# Patient Record
Sex: FEMALE | Race: BLACK OR AFRICAN AMERICAN | NOT HISPANIC OR LATINO | Employment: FULL TIME | URBAN - METROPOLITAN AREA
[De-identification: names, ages, dates, MRNs, and addresses within clinical notes are randomized per-mention and may not be internally consistent; named-entity substitution may affect disease eponyms.]

---

## 2017-09-18 PROBLEM — K59.00 CONSTIPATION: Status: ACTIVE | Noted: 2017-09-18

## 2017-09-18 PROBLEM — M54.9 CVA TENDERNESS: Status: ACTIVE | Noted: 2017-09-18

## 2021-05-06 ENCOUNTER — PATIENT MESSAGE (OUTPATIENT)
Dept: RESEARCH | Facility: HOSPITAL | Age: 66
End: 2021-05-06

## 2021-07-01 ENCOUNTER — PATIENT MESSAGE (OUTPATIENT)
Dept: ADMINISTRATIVE | Facility: OTHER | Age: 66
End: 2021-07-01

## 2023-01-01 ENCOUNTER — PATIENT MESSAGE (OUTPATIENT)
Dept: RESEARCH | Facility: HOSPITAL | Age: 68
End: 2023-01-01
Payer: MEDICARE

## 2023-01-01 ENCOUNTER — HOSPITAL ENCOUNTER (INPATIENT)
Facility: HOSPITAL | Age: 68
LOS: 2 days | Discharge: HOSPICE | DRG: 441 | End: 2023-08-09
Attending: STUDENT IN AN ORGANIZED HEALTH CARE EDUCATION/TRAINING PROGRAM | Admitting: STUDENT IN AN ORGANIZED HEALTH CARE EDUCATION/TRAINING PROGRAM
Payer: MEDICARE

## 2023-01-01 ENCOUNTER — CLINICAL SUPPORT (OUTPATIENT)
Dept: REHABILITATION | Facility: HOSPITAL | Age: 68
End: 2023-01-01
Payer: MEDICARE

## 2023-01-01 ENCOUNTER — HOSPITAL ENCOUNTER (EMERGENCY)
Facility: HOSPITAL | Age: 68
Discharge: HOME | End: 2023-08-06
Attending: STUDENT IN AN ORGANIZED HEALTH CARE EDUCATION/TRAINING PROGRAM
Payer: MEDICARE

## 2023-01-01 ENCOUNTER — DOCUMENTATION ONLY (OUTPATIENT)
Dept: REHABILITATION | Facility: HOSPITAL | Age: 68
End: 2023-01-01

## 2023-01-01 ENCOUNTER — HOSPITAL ENCOUNTER (INPATIENT)
Facility: HOSPITAL | Age: 68
LOS: 2 days | DRG: 951 | End: 2023-08-11
Attending: STUDENT IN AN ORGANIZED HEALTH CARE EDUCATION/TRAINING PROGRAM | Admitting: INTERNAL MEDICINE
Payer: MEDICARE

## 2023-01-01 VITALS
RESPIRATION RATE: 20 BRPM | TEMPERATURE: 99 F | OXYGEN SATURATION: 100 % | SYSTOLIC BLOOD PRESSURE: 116 MMHG | HEART RATE: 93 BPM | DIASTOLIC BLOOD PRESSURE: 71 MMHG

## 2023-01-01 VITALS
HEIGHT: 63 IN | BODY MASS INDEX: 19.41 KG/M2 | WEIGHT: 109.56 LBS | DIASTOLIC BLOOD PRESSURE: 70 MMHG | TEMPERATURE: 98 F | SYSTOLIC BLOOD PRESSURE: 108 MMHG | OXYGEN SATURATION: 100 % | RESPIRATION RATE: 9 BRPM | HEART RATE: 123 BPM

## 2023-01-01 VITALS
RESPIRATION RATE: 10 BRPM | TEMPERATURE: 97 F | DIASTOLIC BLOOD PRESSURE: 35 MMHG | OXYGEN SATURATION: 89 % | SYSTOLIC BLOOD PRESSURE: 61 MMHG | HEART RATE: 106 BPM

## 2023-01-01 DIAGNOSIS — Z51.5 COMFORT MEASURES ONLY STATUS: ICD-10-CM

## 2023-01-01 DIAGNOSIS — R26.9 GAIT ABNORMALITY: ICD-10-CM

## 2023-01-01 DIAGNOSIS — Z74.09 DECREASED STRENGTH, ENDURANCE, AND MOBILITY: ICD-10-CM

## 2023-01-01 DIAGNOSIS — R29.898 IMPAIRED FLEXIBILITY OF LOWER EXTREMITY: ICD-10-CM

## 2023-01-01 DIAGNOSIS — K74.60 HEPATIC CIRRHOSIS, UNSPECIFIED HEPATIC CIRRHOSIS TYPE, UNSPECIFIED WHETHER ASCITES PRESENT: ICD-10-CM

## 2023-01-01 DIAGNOSIS — Z91.81 AT MAXIMUM RISK FOR FALL: ICD-10-CM

## 2023-01-01 DIAGNOSIS — E87.20 LACTIC ACIDOSIS: ICD-10-CM

## 2023-01-01 DIAGNOSIS — R53.1 WEAKNESS: ICD-10-CM

## 2023-01-01 DIAGNOSIS — K70.31 ASCITES DUE TO ALCOHOLIC CIRRHOSIS: Primary | ICD-10-CM

## 2023-01-01 DIAGNOSIS — K65.2 SPONTANEOUS BACTERIAL PERITONITIS: ICD-10-CM

## 2023-01-01 DIAGNOSIS — R53.1 WEAKNESS: Primary | ICD-10-CM

## 2023-01-01 DIAGNOSIS — E72.20 HYPERAMMONEMIA: ICD-10-CM

## 2023-01-01 DIAGNOSIS — R53.1 GENERALIZED WEAKNESS: ICD-10-CM

## 2023-01-01 DIAGNOSIS — R53.1 DECREASED STRENGTH, ENDURANCE, AND MOBILITY: ICD-10-CM

## 2023-01-01 DIAGNOSIS — R26.89 IMPAIRMENT OF BALANCE: ICD-10-CM

## 2023-01-01 DIAGNOSIS — R68.89 DECREASED FUNCTIONAL ACTIVITY TOLERANCE: ICD-10-CM

## 2023-01-01 DIAGNOSIS — K74.60 HEPATIC CIRRHOSIS, UNSPECIFIED HEPATIC CIRRHOSIS TYPE, UNSPECIFIED WHETHER ASCITES PRESENT: Primary | ICD-10-CM

## 2023-01-01 DIAGNOSIS — R07.9 CHEST PAIN: ICD-10-CM

## 2023-01-01 DIAGNOSIS — R18.8 OTHER ASCITES: ICD-10-CM

## 2023-01-01 DIAGNOSIS — R68.89 DECREASED STRENGTH, ENDURANCE, AND MOBILITY: ICD-10-CM

## 2023-01-01 LAB
ABO + RH BLD: NORMAL
ABO + RH BLD: NORMAL
AFP SERPL-MCNC: 9.8 NG/ML (ref 0–8.4)
ALBUMIN FLD-MCNC: <0.4 G/DL
ALBUMIN SERPL BCP-MCNC: 1.3 G/DL (ref 3.5–5.2)
ALBUMIN SERPL BCP-MCNC: 1.3 G/DL (ref 3.5–5.2)
ALBUMIN SERPL BCP-MCNC: 2.2 G/DL (ref 3.5–5.2)
ALBUMIN SERPL BCP-MCNC: 2.3 G/DL (ref 3.5–5.2)
ALLENS TEST: ABNORMAL
ALP SERPL-CCNC: 43 U/L (ref 55–135)
ALP SERPL-CCNC: 56 U/L (ref 55–135)
ALP SERPL-CCNC: 67 U/L (ref 55–135)
ALP SERPL-CCNC: 70 U/L (ref 55–135)
ALT SERPL W/O P-5'-P-CCNC: 21 U/L (ref 10–44)
ALT SERPL W/O P-5'-P-CCNC: 28 U/L (ref 10–44)
ALT SERPL W/O P-5'-P-CCNC: 31 U/L (ref 10–44)
ALT SERPL W/O P-5'-P-CCNC: 34 U/L (ref 10–44)
AMMONIA PLAS-SCNC: 138 UMOL/L (ref 10–50)
AMMONIA PLAS-SCNC: 39 UMOL/L (ref 10–50)
AMPHET+METHAMPHET UR QL: NEGATIVE
ANA SER QL IF: NORMAL
ANION GAP SERPL CALC-SCNC: 10 MMOL/L (ref 8–16)
ANION GAP SERPL CALC-SCNC: 10 MMOL/L (ref 8–16)
ANION GAP SERPL CALC-SCNC: 11 MMOL/L (ref 8–16)
ANION GAP SERPL CALC-SCNC: 19 MMOL/L (ref 8–16)
ANISOCYTOSIS BLD QL SMEAR: ABNORMAL
ANISOCYTOSIS BLD QL SMEAR: ABNORMAL
APPEARANCE FLD: CLEAR
APTT PPP: 42.7 SEC (ref 21–32)
APTT PPP: 45.1 SEC (ref 21–32)
AST SERPL-CCNC: 110 U/L (ref 10–40)
AST SERPL-CCNC: 60 U/L (ref 10–40)
AST SERPL-CCNC: 81 U/L (ref 10–40)
AST SERPL-CCNC: 94 U/L (ref 10–40)
BACTERIA #/AREA URNS AUTO: ABNORMAL /HPF
BACTERIA #/AREA URNS HPF: ABNORMAL /HPF
BACTERIA SPEC AEROBE CULT: NO GROWTH
BACTERIA UR CULT: NO GROWTH
BARBITURATES UR QL SCN>200 NG/ML: NEGATIVE
BASOPHILS # BLD AUTO: 0.01 K/UL (ref 0–0.2)
BASOPHILS # BLD AUTO: 0.02 K/UL (ref 0–0.2)
BASOPHILS # BLD AUTO: 0.03 K/UL (ref 0–0.2)
BASOPHILS # BLD AUTO: 0.03 K/UL (ref 0–0.2)
BASOPHILS # BLD AUTO: 0.04 K/UL (ref 0–0.2)
BASOPHILS NFR BLD: 0.1 % (ref 0–1.9)
BASOPHILS NFR BLD: 0.2 % (ref 0–1.9)
BASOPHILS NFR BLD: 0.5 % (ref 0–1.9)
BASOPHILS NFR BLD: 0.7 % (ref 0–1.9)
BASOPHILS NFR BLD: 0.7 % (ref 0–1.9)
BENZODIAZ UR QL SCN>200 NG/ML: NEGATIVE
BILIRUB DIRECT SERPL-MCNC: 4.3 MG/DL (ref 0.1–0.3)
BILIRUB DIRECT SERPL-MCNC: 5 MG/DL (ref 0.1–0.3)
BILIRUB SERPL-MCNC: 6.2 MG/DL (ref 0.1–1)
BILIRUB SERPL-MCNC: 7.1 MG/DL (ref 0.1–1)
BILIRUB SERPL-MCNC: 8.3 MG/DL (ref 0.1–1)
BILIRUB SERPL-MCNC: 8.5 MG/DL (ref 0.1–1)
BILIRUB UR QL STRIP: ABNORMAL
BILIRUB UR QL STRIP: ABNORMAL
BLD GP AB SCN CELLS X3 SERPL QL: NORMAL
BLD GP AB SCN CELLS X3 SERPL QL: NORMAL
BLD PROD TYP BPU: NORMAL
BLD PROD TYP BPU: NORMAL
BLOOD UNIT EXPIRATION DATE: NORMAL
BLOOD UNIT EXPIRATION DATE: NORMAL
BLOOD UNIT TYPE CODE: 5100
BLOOD UNIT TYPE CODE: 5100
BLOOD UNIT TYPE: NORMAL
BLOOD UNIT TYPE: NORMAL
BNP SERPL-MCNC: 70 PG/ML (ref 0–99)
BODY FLD TYPE: NORMAL
BODY FLUID SOURCE, LDH: NORMAL
BUN SERPL-MCNC: 16 MG/DL (ref 8–23)
BUN SERPL-MCNC: 17 MG/DL (ref 8–23)
BUN SERPL-MCNC: 21 MG/DL (ref 6–30)
BUN SERPL-MCNC: 22 MG/DL (ref 8–23)
BUN SERPL-MCNC: 23 MG/DL (ref 8–23)
BURR CELLS BLD QL SMEAR: ABNORMAL
BZE UR QL SCN: NEGATIVE
CALCIUM SERPL-MCNC: 7.7 MG/DL (ref 8.7–10.5)
CALCIUM SERPL-MCNC: 8 MG/DL (ref 8.7–10.5)
CALCIUM SERPL-MCNC: 8 MG/DL (ref 8.7–10.5)
CALCIUM SERPL-MCNC: 8.6 MG/DL (ref 8.7–10.5)
CANNABINOIDS UR QL SCN: ABNORMAL
CHLORIDE SERPL-SCNC: 100 MMOL/L (ref 95–110)
CHLORIDE SERPL-SCNC: 96 MMOL/L (ref 95–110)
CHLORIDE SERPL-SCNC: 97 MMOL/L (ref 95–110)
CHLORIDE SERPL-SCNC: 99 MMOL/L (ref 95–110)
CHLORIDE SERPL-SCNC: 99 MMOL/L (ref 95–110)
CK SERPL-CCNC: 26 U/L (ref 20–180)
CLARITY UR REFRACT.AUTO: ABNORMAL
CLARITY UR: ABNORMAL
CLINICAL BIOCHEMIST REVIEW: NORMAL
CO2 SERPL-SCNC: 15 MMOL/L (ref 23–29)
CO2 SERPL-SCNC: 21 MMOL/L (ref 23–29)
CO2 SERPL-SCNC: 23 MMOL/L (ref 23–29)
CO2 SERPL-SCNC: 23 MMOL/L (ref 23–29)
CODING SYSTEM: NORMAL
CODING SYSTEM: NORMAL
COLOR FLD: YELLOW
COLOR UR AUTO: YELLOW
COLOR UR: ABNORMAL
CREAT SERPL-MCNC: 0.8 MG/DL (ref 0.5–1.4)
CREAT SERPL-MCNC: 1.1 MG/DL (ref 0.5–1.4)
CREAT SERPL-MCNC: 1.2 MG/DL (ref 0.5–1.4)
CREAT SERPL-MCNC: 1.6 MG/DL (ref 0.5–1.4)
CREAT SERPL-MCNC: 1.7 MG/DL (ref 0.5–1.4)
CREAT UR-MCNC: 109 MG/DL (ref 15–325)
CROSSMATCH INTERPRETATION: NORMAL
CROSSMATCH INTERPRETATION: NORMAL
DACRYOCYTES BLD QL SMEAR: ABNORMAL
DACRYOCYTES BLD QL SMEAR: ABNORMAL
DELSYS: ABNORMAL
DELSYS: ABNORMAL
DIFFERENTIAL METHOD: ABNORMAL
DISPENSE STATUS: NORMAL
DISPENSE STATUS: NORMAL
EOSINOPHIL # BLD AUTO: 0 K/UL (ref 0–0.5)
EOSINOPHIL # BLD AUTO: 0.1 K/UL (ref 0–0.5)
EOSINOPHIL NFR BLD: 0.4 % (ref 0–8)
EOSINOPHIL NFR BLD: 1 % (ref 0–8)
EOSINOPHIL NFR BLD: 1.6 % (ref 0–8)
EOSINOPHIL NFR BLD: 1.7 % (ref 0–8)
EOSINOPHIL NFR BLD: 2.8 % (ref 0–8)
ERYTHROCYTE [DISTWIDTH] IN BLOOD BY AUTOMATED COUNT: 23.2 % (ref 11.5–14.5)
ERYTHROCYTE [DISTWIDTH] IN BLOOD BY AUTOMATED COUNT: 24 % (ref 11.5–14.5)
ERYTHROCYTE [DISTWIDTH] IN BLOOD BY AUTOMATED COUNT: 24.5 % (ref 11.5–14.5)
ERYTHROCYTE [DISTWIDTH] IN BLOOD BY AUTOMATED COUNT: 24.5 % (ref 11.5–14.5)
ERYTHROCYTE [DISTWIDTH] IN BLOOD BY AUTOMATED COUNT: 24.9 % (ref 11.5–14.5)
EST. GFR  (NO RACE VARIABLE): 33.1 ML/MIN/1.73 M^2
EST. GFR  (NO RACE VARIABLE): 35.6 ML/MIN/1.73 M^2
EST. GFR  (NO RACE VARIABLE): 55.8 ML/MIN/1.73 M^2
EST. GFR  (NO RACE VARIABLE): >60 ML/MIN/1.73 M^2
ETHANOL UR-MCNC: <10 MG/DL
FIO2: 24
FIO2: 24
FLOW: 1
FLOW: 1
GGT SERPL-CCNC: 27 U/L (ref 8–55)
GLUCOSE SERPL-MCNC: 119 MG/DL (ref 70–110)
GLUCOSE SERPL-MCNC: 85 MG/DL (ref 70–110)
GLUCOSE SERPL-MCNC: 95 MG/DL (ref 70–110)
GLUCOSE SERPL-MCNC: 95 MG/DL (ref 70–110)
GLUCOSE SERPL-MCNC: 96 MG/DL (ref 70–110)
GLUCOSE UR QL STRIP: NEGATIVE
GLUCOSE UR QL STRIP: NEGATIVE
GRAM STN SPEC: NORMAL
GRAM STN SPEC: NORMAL
HCO3 UR-SCNC: 27.5 MMOL/L (ref 24–28)
HCT VFR BLD AUTO: 18.6 % (ref 37–48.5)
HCT VFR BLD AUTO: 19.4 % (ref 37–48.5)
HCT VFR BLD AUTO: 23.2 % (ref 37–48.5)
HCT VFR BLD AUTO: 24.3 % (ref 37–48.5)
HCT VFR BLD AUTO: 28.9 % (ref 37–48.5)
HCT VFR BLD CALC: 27 %PCV (ref 36–54)
HGB BLD-MCNC: 5.8 G/DL (ref 12–16)
HGB BLD-MCNC: 6.4 G/DL (ref 12–16)
HGB BLD-MCNC: 7.8 G/DL (ref 12–16)
HGB BLD-MCNC: 7.8 G/DL (ref 12–16)
HGB BLD-MCNC: 9.7 G/DL (ref 12–16)
HGB UR QL STRIP: ABNORMAL
HGB UR QL STRIP: ABNORMAL
HIV 1+2 AB+HIV1 P24 AG SERPL QL IA: NORMAL
HYALINE CASTS #/AREA URNS LPF: 1 /LPF
HYALINE CASTS UR QL AUTO: 61 /LPF
HYPOCHROMIA BLD QL SMEAR: ABNORMAL
HYPOCHROMIA BLD QL SMEAR: ABNORMAL
IMM GRANULOCYTES # BLD AUTO: 0.03 K/UL (ref 0–0.04)
IMM GRANULOCYTES # BLD AUTO: 0.03 K/UL (ref 0–0.04)
IMM GRANULOCYTES # BLD AUTO: 0.04 K/UL (ref 0–0.04)
IMM GRANULOCYTES NFR BLD AUTO: 0.4 % (ref 0–0.5)
IMM GRANULOCYTES NFR BLD AUTO: 0.5 % (ref 0–0.5)
IMM GRANULOCYTES NFR BLD AUTO: 0.6 % (ref 0–0.5)
IMM GRANULOCYTES NFR BLD AUTO: 0.7 % (ref 0–0.5)
IMM GRANULOCYTES NFR BLD AUTO: 0.7 % (ref 0–0.5)
INR PPP: 2.4 (ref 0.8–1.2)
INR PPP: 2.4 (ref 0.8–1.2)
INR PPP: 2.6 (ref 0.8–1.2)
KETONES UR QL STRIP: ABNORMAL
KETONES UR QL STRIP: NEGATIVE
LACTATE SERPL-SCNC: 10.3 MMOL/L (ref 0.5–2.2)
LACTATE SERPL-SCNC: 3.4 MMOL/L (ref 0.5–2.2)
LACTATE SERPL-SCNC: 3.7 MMOL/L (ref 0.5–2.2)
LACTATE SERPL-SCNC: 4.9 MMOL/L (ref 0.5–2.2)
LACTATE SERPL-SCNC: 5.3 MMOL/L (ref 0.5–2.2)
LACTATE SERPL-SCNC: 6 MMOL/L (ref 0.5–2.2)
LACTATE SERPL-SCNC: 7.1 MMOL/L (ref 0.5–2.2)
LDH FLD L TO P-CCNC: 147 U/L
LEUKOCYTE ESTERASE UR QL STRIP: ABNORMAL
LEUKOCYTE ESTERASE UR QL STRIP: NEGATIVE
LIPASE SERPL-CCNC: 25 U/L (ref 4–60)
LIPASE SERPL-CCNC: 55 U/L (ref 4–60)
LKM AB SER-ACNC: 1.2 UNITS
LYMPHOCYTES # BLD AUTO: 0.9 K/UL (ref 1–4.8)
LYMPHOCYTES # BLD AUTO: 1.1 K/UL (ref 1–4.8)
LYMPHOCYTES # BLD AUTO: 1.2 K/UL (ref 1–4.8)
LYMPHOCYTES # BLD AUTO: 1.5 K/UL (ref 1–4.8)
LYMPHOCYTES # BLD AUTO: 2.2 K/UL (ref 1–4.8)
LYMPHOCYTES NFR BLD: 13.5 % (ref 18–48)
LYMPHOCYTES NFR BLD: 13.9 % (ref 18–48)
LYMPHOCYTES NFR BLD: 14.5 % (ref 18–48)
LYMPHOCYTES NFR BLD: 32.6 % (ref 18–48)
LYMPHOCYTES NFR BLD: 36.1 % (ref 18–48)
LYMPHOCYTES NFR FLD MANUAL: 49 %
MAGNESIUM SERPL-MCNC: 2.1 MG/DL (ref 1.6–2.6)
MAGNESIUM SERPL-MCNC: 2.3 MG/DL (ref 1.6–2.6)
MCH RBC QN AUTO: 31.6 PG (ref 27–31)
MCH RBC QN AUTO: 31.9 PG (ref 27–31)
MCH RBC QN AUTO: 32 PG (ref 27–31)
MCH RBC QN AUTO: 32.4 PG (ref 27–31)
MCH RBC QN AUTO: 33 PG (ref 27–31)
MCHC RBC AUTO-ENTMCNC: 31.2 G/DL (ref 32–36)
MCHC RBC AUTO-ENTMCNC: 32.1 G/DL (ref 32–36)
MCHC RBC AUTO-ENTMCNC: 33 G/DL (ref 32–36)
MCHC RBC AUTO-ENTMCNC: 33.6 G/DL (ref 32–36)
MCHC RBC AUTO-ENTMCNC: 33.6 G/DL (ref 32–36)
MCV RBC AUTO: 100 FL (ref 82–98)
MCV RBC AUTO: 100 FL (ref 82–98)
MCV RBC AUTO: 104 FL (ref 82–98)
MCV RBC AUTO: 94 FL (ref 82–98)
MCV RBC AUTO: 95 FL (ref 82–98)
MESOTHL CELL NFR FLD MANUAL: 9 %
METHADONE UR QL SCN>300 NG/ML: NEGATIVE
MICROSCOPIC COMMENT: ABNORMAL
MICROSCOPIC COMMENT: ABNORMAL
MODE: ABNORMAL
MODE: ABNORMAL
MONOCYTES # BLD AUTO: 0.4 K/UL (ref 0.3–1)
MONOCYTES # BLD AUTO: 0.5 K/UL (ref 0.3–1)
MONOCYTES # BLD AUTO: 0.6 K/UL (ref 0.3–1)
MONOCYTES NFR BLD: 12 % (ref 4–15)
MONOCYTES NFR BLD: 5.3 % (ref 4–15)
MONOCYTES NFR BLD: 6.7 % (ref 4–15)
MONOCYTES NFR BLD: 7.1 % (ref 4–15)
MONOCYTES NFR BLD: 9.4 % (ref 4–15)
MONOS+MACROS NFR FLD MANUAL: 34 %
NEUTROPHILS # BLD AUTO: 2.3 K/UL (ref 1.8–7.7)
NEUTROPHILS # BLD AUTO: 3.1 K/UL (ref 1.8–7.7)
NEUTROPHILS # BLD AUTO: 4.9 K/UL (ref 1.8–7.7)
NEUTROPHILS # BLD AUTO: 6.3 K/UL (ref 1.8–7.7)
NEUTROPHILS # BLD AUTO: 6.4 K/UL (ref 1.8–7.7)
NEUTROPHILS NFR BLD: 51.2 % (ref 38–73)
NEUTROPHILS NFR BLD: 51.5 % (ref 38–73)
NEUTROPHILS NFR BLD: 76.5 % (ref 38–73)
NEUTROPHILS NFR BLD: 76.9 % (ref 38–73)
NEUTROPHILS NFR BLD: 80.2 % (ref 38–73)
NEUTROPHILS NFR FLD MANUAL: 8 %
NITRITE UR QL STRIP: NEGATIVE
NITRITE UR QL STRIP: NEGATIVE
NRBC BLD-RTO: 0 /100 WBC
NRBC BLD-RTO: 1 /100 WBC
NUM UNITS TRANS PACKED RBC: NORMAL
NUM UNITS TRANS PACKED RBC: NORMAL
OPIATES UR QL SCN: NEGATIVE
OSMOLALITY SERPL: 298 MOSM/KG (ref 275–295)
OSMOLALITY UR: 378 MOSM/KG (ref 50–1200)
PCO2 BLDA: 40.8 MMHG (ref 35–45)
PCP UR QL SCN>25 NG/ML: NEGATIVE
PH SMN: 7.44 [PH] (ref 7.35–7.45)
PH UR STRIP: 6 [PH] (ref 5–8)
PH UR STRIP: 6 [PH] (ref 5–8)
PHOSPHATE SERPL-MCNC: 4.7 MG/DL (ref 2.7–4.5)
PLATELET # BLD AUTO: 44 K/UL (ref 150–450)
PLATELET # BLD AUTO: 51 K/UL (ref 150–450)
PLATELET # BLD AUTO: 57 K/UL (ref 150–450)
PLATELET # BLD AUTO: 77 K/UL (ref 150–450)
PLATELET # BLD AUTO: 83 K/UL (ref 150–450)
PLATELET BLD QL SMEAR: ABNORMAL
PLATELET BLD QL SMEAR: ABNORMAL
PLPETH BLD-MCNC: <10 NG/ML
PMV BLD AUTO: 10 FL (ref 9.2–12.9)
PMV BLD AUTO: 10.6 FL (ref 9.2–12.9)
PMV BLD AUTO: 9.6 FL (ref 9.2–12.9)
PMV BLD AUTO: 9.6 FL (ref 9.2–12.9)
PMV BLD AUTO: 9.9 FL (ref 9.2–12.9)
PO2 BLDA: 27 MMHG (ref 40–60)
PO2 BLDA: 30 MMHG (ref 40–60)
PO2 BLDA: 30 MMHG (ref 40–60)
POC BE: 3 MMOL/L
POC IONIZED CALCIUM: 1 MMOL/L (ref 1.06–1.42)
POC SATURATED O2: 52 % (ref 95–100)
POC SATURATED O2: 62 % (ref 95–100)
POC SATURATED O2: 62 % (ref 95–100)
POC TCO2 (MEASURED): 24 MMOL/L (ref 23–29)
POC TCO2: 29 MMOL/L (ref 24–29)
POCT GLUCOSE: 106 MG/DL (ref 70–110)
POCT GLUCOSE: 113 MG/DL (ref 70–110)
POCT GLUCOSE: 118 MG/DL (ref 70–110)
POCT GLUCOSE: 118 MG/DL (ref 70–110)
POCT GLUCOSE: 99 MG/DL (ref 70–110)
POIKILOCYTOSIS BLD QL SMEAR: SLIGHT
POLYCHROMASIA BLD QL SMEAR: ABNORMAL
POPETH BLD-MCNC: <10 NG/ML
POTASSIUM BLD-SCNC: 3.8 MMOL/L (ref 3.5–5.1)
POTASSIUM SERPL-SCNC: 3.1 MMOL/L (ref 3.5–5.1)
POTASSIUM SERPL-SCNC: 3.3 MMOL/L (ref 3.5–5.1)
POTASSIUM SERPL-SCNC: 3.7 MMOL/L (ref 3.5–5.1)
POTASSIUM SERPL-SCNC: 3.8 MMOL/L (ref 3.5–5.1)
PROT FLD-MCNC: 1.4 G/DL
PROT SERPL-MCNC: 5.6 G/DL (ref 6–8.4)
PROT SERPL-MCNC: 6.8 G/DL (ref 6–8.4)
PROT SERPL-MCNC: 7.3 G/DL (ref 6–8.4)
PROT SERPL-MCNC: 7.6 G/DL (ref 6–8.4)
PROT UR QL STRIP: ABNORMAL
PROT UR QL STRIP: NEGATIVE
PROTHROMBIN TIME: 23.8 SEC (ref 9–12.5)
PROTHROMBIN TIME: 23.9 SEC (ref 9–12.5)
PROTHROMBIN TIME: 26.6 SEC (ref 9–12.5)
RBC # BLD AUTO: 1.79 M/UL (ref 4–5.4)
RBC # BLD AUTO: 1.94 M/UL (ref 4–5.4)
RBC # BLD AUTO: 2.44 M/UL (ref 4–5.4)
RBC # BLD AUTO: 2.47 M/UL (ref 4–5.4)
RBC # BLD AUTO: 3.04 M/UL (ref 4–5.4)
RBC #/AREA URNS AUTO: >100 /HPF (ref 0–4)
RBC #/AREA URNS HPF: 1 /HPF (ref 0–4)
SAMPLE: ABNORMAL
SCHISTOCYTES BLD QL SMEAR: ABNORMAL
SCHISTOCYTES BLD QL SMEAR: PRESENT
SCHISTOCYTES BLD QL SMEAR: PRESENT
SITE: ABNORMAL
SMUDGE CELLS BLD QL SMEAR: PRESENT
SODIUM BLD-SCNC: 135 MMOL/L (ref 136–145)
SODIUM SERPL-SCNC: 129 MMOL/L (ref 136–145)
SODIUM SERPL-SCNC: 132 MMOL/L (ref 136–145)
SODIUM SERPL-SCNC: 132 MMOL/L (ref 136–145)
SODIUM SERPL-SCNC: 134 MMOL/L (ref 136–145)
SODIUM UR-SCNC: <10 MMOL/L (ref 20–250)
SP GR UR STRIP: >1.03 (ref 1–1.03)
SP GR UR STRIP: >=1.03 (ref 1–1.03)
SP02: 100
SP02: 100
SPECIMEN OUTDATE: NORMAL
SPECIMEN OUTDATE: NORMAL
SPECIMEN SOURCE: NORMAL
SPECIMEN SOURCE: NORMAL
SQUAMOUS #/AREA URNS AUTO: 36 /HPF
SQUAMOUS #/AREA URNS HPF: 1 /HPF
TARGETS BLD QL SMEAR: ABNORMAL
TOXICOLOGY INFORMATION: ABNORMAL
TROPONIN I SERPL DL<=0.01 NG/ML-MCNC: <0.006 NG/ML (ref 0–0.03)
TSH SERPL DL<=0.005 MIU/L-ACNC: 2.38 UIU/ML (ref 0.4–4)
URN SPEC COLLECT METH UR: ABNORMAL
URN SPEC COLLECT METH UR: ABNORMAL
UROBILINOGEN UR STRIP-ACNC: >=8 EU/DL
WBC # BLD AUTO: 4.57 K/UL (ref 3.9–12.7)
WBC # BLD AUTO: 6.09 K/UL (ref 3.9–12.7)
WBC # BLD AUTO: 6.31 K/UL (ref 3.9–12.7)
WBC # BLD AUTO: 7.98 K/UL (ref 3.9–12.7)
WBC # BLD AUTO: 8.23 K/UL (ref 3.9–12.7)
WBC # FLD: 54 /CU MM
WBC #/AREA URNS AUTO: 18 /HPF (ref 0–5)
WBC #/AREA URNS HPF: 6 /HPF (ref 0–5)

## 2023-01-01 PROCEDURE — 80053 COMPREHEN METABOLIC PANEL: CPT | Performed by: HOSPITALIST

## 2023-01-01 PROCEDURE — 93010 ELECTROCARDIOGRAM REPORT: CPT | Mod: ,,, | Performed by: INTERNAL MEDICINE

## 2023-01-01 PROCEDURE — 99499 UNLISTED E&M SERVICE: CPT | Mod: GV,,, | Performed by: FAMILY MEDICINE

## 2023-01-01 PROCEDURE — 63600175 PHARM REV CODE 636 W HCPCS: Performed by: HOSPITALIST

## 2023-01-01 PROCEDURE — 87389 HIV-1 AG W/HIV-1&-2 AB AG IA: CPT | Performed by: PHYSICIAN ASSISTANT

## 2023-01-01 PROCEDURE — 94761 N-INVAS EAR/PLS OXIMETRY MLT: CPT

## 2023-01-01 PROCEDURE — P9016 RBC LEUKOCYTES REDUCED: HCPCS | Performed by: STUDENT IN AN ORGANIZED HEALTH CARE EDUCATION/TRAINING PROGRAM

## 2023-01-01 PROCEDURE — 83615 LACTATE (LD) (LDH) ENZYME: CPT | Performed by: STUDENT IN AN ORGANIZED HEALTH CARE EDUCATION/TRAINING PROGRAM

## 2023-01-01 PROCEDURE — 99900035 HC TECH TIME PER 15 MIN (STAT)

## 2023-01-01 PROCEDURE — 25000003 PHARM REV CODE 250: Performed by: STUDENT IN AN ORGANIZED HEALTH CARE EDUCATION/TRAINING PROGRAM

## 2023-01-01 PROCEDURE — 25000003 PHARM REV CODE 250

## 2023-01-01 PROCEDURE — 25000003 PHARM REV CODE 250: Performed by: HOSPITALIST

## 2023-01-01 PROCEDURE — 87205 SMEAR GRAM STAIN: CPT | Mod: 59 | Performed by: STUDENT IN AN ORGANIZED HEALTH CARE EDUCATION/TRAINING PROGRAM

## 2023-01-01 PROCEDURE — 99223 PR INITIAL HOSPITAL CARE,LEVL III: ICD-10-PCS | Mod: GC,,, | Performed by: INTERNAL MEDICINE

## 2023-01-01 PROCEDURE — 27000221 HC OXYGEN, UP TO 24 HOURS

## 2023-01-01 PROCEDURE — 99233 SBSQ HOSP IP/OBS HIGH 50: CPT | Mod: GV,,, | Performed by: FAMILY MEDICINE

## 2023-01-01 PROCEDURE — 86038 ANTINUCLEAR ANTIBODIES: CPT | Performed by: HOSPITALIST

## 2023-01-01 PROCEDURE — 85610 PROTHROMBIN TIME: CPT | Performed by: STUDENT IN AN ORGANIZED HEALTH CARE EDUCATION/TRAINING PROGRAM

## 2023-01-01 PROCEDURE — 82140 ASSAY OF AMMONIA: CPT | Performed by: COMMUNITY HEALTH WORKER

## 2023-01-01 PROCEDURE — 82800 BLOOD PH: CPT

## 2023-01-01 PROCEDURE — 80321 ALCOHOLS BIOMARKERS 1OR 2: CPT | Performed by: HOSPITALIST

## 2023-01-01 PROCEDURE — 99223 1ST HOSP IP/OBS HIGH 75: CPT | Mod: ,,, | Performed by: HOSPITALIST

## 2023-01-01 PROCEDURE — 82803 BLOOD GASES ANY COMBINATION: CPT

## 2023-01-01 PROCEDURE — 11000001 HC ACUTE MED/SURG PRIVATE ROOM

## 2023-01-01 PROCEDURE — P9047 ALBUMIN (HUMAN), 25%, 50ML: HCPCS | Mod: JZ,JG | Performed by: HOSPITALIST

## 2023-01-01 PROCEDURE — 87086 URINE CULTURE/COLONY COUNT: CPT | Performed by: STUDENT IN AN ORGANIZED HEALTH CARE EDUCATION/TRAINING PROGRAM

## 2023-01-01 PROCEDURE — 99223 PR INITIAL HOSPITAL CARE,LEVL III: ICD-10-PCS | Mod: ,,, | Performed by: HOSPITALIST

## 2023-01-01 PROCEDURE — 82977 ASSAY OF GGT: CPT | Performed by: STUDENT IN AN ORGANIZED HEALTH CARE EDUCATION/TRAINING PROGRAM

## 2023-01-01 PROCEDURE — 63600175 PHARM REV CODE 636 W HCPCS: Performed by: COMMUNITY HEALTH WORKER

## 2023-01-01 PROCEDURE — 99238 PR HOSPITAL DISCHARGE DAY,<30 MIN: ICD-10-PCS | Mod: GV,,, | Performed by: FAMILY MEDICINE

## 2023-01-01 PROCEDURE — 36430 TRANSFUSION BLD/BLD COMPNT: CPT

## 2023-01-01 PROCEDURE — 85025 COMPLETE CBC W/AUTO DIFF WBC: CPT | Performed by: INTERNAL MEDICINE

## 2023-01-01 PROCEDURE — 87040 BLOOD CULTURE FOR BACTERIA: CPT | Mod: 59 | Performed by: HOSPITALIST

## 2023-01-01 PROCEDURE — 87070 CULTURE OTHR SPECIMN AEROBIC: CPT | Mod: 59 | Performed by: STUDENT IN AN ORGANIZED HEALTH CARE EDUCATION/TRAINING PROGRAM

## 2023-01-01 PROCEDURE — 83930 ASSAY OF BLOOD OSMOLALITY: CPT

## 2023-01-01 PROCEDURE — 93005 ELECTROCARDIOGRAM TRACING: CPT

## 2023-01-01 PROCEDURE — 83735 ASSAY OF MAGNESIUM: CPT | Performed by: HOSPITALIST

## 2023-01-01 PROCEDURE — 25000003 PHARM REV CODE 250: Performed by: COMMUNITY HEALTH WORKER

## 2023-01-01 PROCEDURE — 99222 PR INITIAL HOSPITAL CARE,LEVL II: ICD-10-PCS | Mod: ,,, | Performed by: INTERNAL MEDICINE

## 2023-01-01 PROCEDURE — 20600001 HC STEP DOWN PRIVATE ROOM

## 2023-01-01 PROCEDURE — 83605 ASSAY OF LACTIC ACID: CPT | Mod: 91 | Performed by: STUDENT IN AN ORGANIZED HEALTH CARE EDUCATION/TRAINING PROGRAM

## 2023-01-01 PROCEDURE — 85025 COMPLETE CBC W/AUTO DIFF WBC: CPT | Performed by: STUDENT IN AN ORGANIZED HEALTH CARE EDUCATION/TRAINING PROGRAM

## 2023-01-01 PROCEDURE — 85730 THROMBOPLASTIN TIME PARTIAL: CPT | Performed by: STUDENT IN AN ORGANIZED HEALTH CARE EDUCATION/TRAINING PROGRAM

## 2023-01-01 PROCEDURE — 86920 COMPATIBILITY TEST SPIN: CPT | Performed by: STUDENT IN AN ORGANIZED HEALTH CARE EDUCATION/TRAINING PROGRAM

## 2023-01-01 PROCEDURE — 86900 BLOOD TYPING SEROLOGIC ABO: CPT | Performed by: STUDENT IN AN ORGANIZED HEALTH CARE EDUCATION/TRAINING PROGRAM

## 2023-01-01 PROCEDURE — 63600175 PHARM REV CODE 636 W HCPCS: Performed by: STUDENT IN AN ORGANIZED HEALTH CARE EDUCATION/TRAINING PROGRAM

## 2023-01-01 PROCEDURE — 82105 ALPHA-FETOPROTEIN SERUM: CPT | Performed by: HOSPITALIST

## 2023-01-01 PROCEDURE — 82550 ASSAY OF CK (CPK): CPT | Performed by: STUDENT IN AN ORGANIZED HEALTH CARE EDUCATION/TRAINING PROGRAM

## 2023-01-01 PROCEDURE — 84300 ASSAY OF URINE SODIUM: CPT

## 2023-01-01 PROCEDURE — 87075 CULTR BACTERIA EXCEPT BLOOD: CPT | Mod: 59 | Performed by: STUDENT IN AN ORGANIZED HEALTH CARE EDUCATION/TRAINING PROGRAM

## 2023-01-01 PROCEDURE — 93010 EKG 12-LEAD: ICD-10-PCS | Mod: ,,, | Performed by: INTERNAL MEDICINE

## 2023-01-01 PROCEDURE — 99233 SBSQ HOSP IP/OBS HIGH 50: CPT | Mod: GC,,, | Performed by: INTERNAL MEDICINE

## 2023-01-01 PROCEDURE — 83690 ASSAY OF LIPASE: CPT | Performed by: STUDENT IN AN ORGANIZED HEALTH CARE EDUCATION/TRAINING PROGRAM

## 2023-01-01 PROCEDURE — 82248 BILIRUBIN DIRECT: CPT | Performed by: HOSPITALIST

## 2023-01-01 PROCEDURE — 81000 URINALYSIS NONAUTO W/SCOPE: CPT | Performed by: STUDENT IN AN ORGANIZED HEALTH CARE EDUCATION/TRAINING PROGRAM

## 2023-01-01 PROCEDURE — 99223 1ST HOSP IP/OBS HIGH 75: CPT | Mod: GC,,, | Performed by: INTERNAL MEDICINE

## 2023-01-01 PROCEDURE — 97530 THERAPEUTIC ACTIVITIES: CPT | Mod: PN

## 2023-01-01 PROCEDURE — 83605 ASSAY OF LACTIC ACID: CPT | Performed by: STUDENT IN AN ORGANIZED HEALTH CARE EDUCATION/TRAINING PROGRAM

## 2023-01-01 PROCEDURE — 25000003 PHARM REV CODE 250: Performed by: FAMILY MEDICINE

## 2023-01-01 PROCEDURE — 36415 COLL VENOUS BLD VENIPUNCTURE: CPT | Performed by: STUDENT IN AN ORGANIZED HEALTH CARE EDUCATION/TRAINING PROGRAM

## 2023-01-01 PROCEDURE — 25500020 PHARM REV CODE 255: Performed by: STUDENT IN AN ORGANIZED HEALTH CARE EDUCATION/TRAINING PROGRAM

## 2023-01-01 PROCEDURE — 81001 URINALYSIS AUTO W/SCOPE: CPT | Performed by: STUDENT IN AN ORGANIZED HEALTH CARE EDUCATION/TRAINING PROGRAM

## 2023-01-01 PROCEDURE — 20000000 HC ICU ROOM

## 2023-01-01 PROCEDURE — 63600175 PHARM REV CODE 636 W HCPCS: Performed by: INTERNAL MEDICINE

## 2023-01-01 PROCEDURE — 99222 1ST HOSP IP/OBS MODERATE 55: CPT | Mod: ,,, | Performed by: INTERNAL MEDICINE

## 2023-01-01 PROCEDURE — 86015 ACTIN ANTIBODY EACH: CPT | Performed by: HOSPITALIST

## 2023-01-01 PROCEDURE — 83735 ASSAY OF MAGNESIUM: CPT | Performed by: STUDENT IN AN ORGANIZED HEALTH CARE EDUCATION/TRAINING PROGRAM

## 2023-01-01 PROCEDURE — 85025 COMPLETE CBC W/AUTO DIFF WBC: CPT | Performed by: HOSPITALIST

## 2023-01-01 PROCEDURE — 84484 ASSAY OF TROPONIN QUANT: CPT | Performed by: STUDENT IN AN ORGANIZED HEALTH CARE EDUCATION/TRAINING PROGRAM

## 2023-01-01 PROCEDURE — 99233 PR SUBSEQUENT HOSPITAL CARE,LEVL III: ICD-10-PCS | Mod: GV,,, | Performed by: FAMILY MEDICINE

## 2023-01-01 PROCEDURE — 82140 ASSAY OF AMMONIA: CPT | Performed by: STUDENT IN AN ORGANIZED HEALTH CARE EDUCATION/TRAINING PROGRAM

## 2023-01-01 PROCEDURE — 83935 ASSAY OF URINE OSMOLALITY: CPT

## 2023-01-01 PROCEDURE — 85610 PROTHROMBIN TIME: CPT | Performed by: HOSPITALIST

## 2023-01-01 PROCEDURE — 99499 NO LOS: ICD-10-PCS | Mod: GV,,, | Performed by: FAMILY MEDICINE

## 2023-01-01 PROCEDURE — 80053 COMPREHEN METABOLIC PANEL: CPT | Performed by: STUDENT IN AN ORGANIZED HEALTH CARE EDUCATION/TRAINING PROGRAM

## 2023-01-01 PROCEDURE — 83880 ASSAY OF NATRIURETIC PEPTIDE: CPT | Performed by: STUDENT IN AN ORGANIZED HEALTH CARE EDUCATION/TRAINING PROGRAM

## 2023-01-01 PROCEDURE — 99238 HOSP IP/OBS DSCHRG MGMT 30/<: CPT | Mod: GV,,, | Performed by: FAMILY MEDICINE

## 2023-01-01 PROCEDURE — 82042 OTHER SOURCE ALBUMIN QUAN EA: CPT | Performed by: STUDENT IN AN ORGANIZED HEALTH CARE EDUCATION/TRAINING PROGRAM

## 2023-01-01 PROCEDURE — 89051 BODY FLUID CELL COUNT: CPT | Performed by: STUDENT IN AN ORGANIZED HEALTH CARE EDUCATION/TRAINING PROGRAM

## 2023-01-01 PROCEDURE — 25000003 PHARM REV CODE 250: Performed by: INTERNAL MEDICINE

## 2023-01-01 PROCEDURE — 84443 ASSAY THYROID STIM HORMONE: CPT | Performed by: STUDENT IN AN ORGANIZED HEALTH CARE EDUCATION/TRAINING PROGRAM

## 2023-01-01 PROCEDURE — 97162 PT EVAL MOD COMPLEX 30 MIN: CPT | Mod: PN

## 2023-01-01 PROCEDURE — 96365 THER/PROPH/DIAG IV INF INIT: CPT

## 2023-01-01 PROCEDURE — 84157 ASSAY OF PROTEIN OTHER: CPT | Performed by: STUDENT IN AN ORGANIZED HEALTH CARE EDUCATION/TRAINING PROGRAM

## 2023-01-01 PROCEDURE — 99223 PR INITIAL HOSPITAL CARE,LEVL III: ICD-10-PCS | Mod: ,,, | Performed by: INTERNAL MEDICINE

## 2023-01-01 PROCEDURE — 99285 EMERGENCY DEPT VISIT HI MDM: CPT | Mod: 25

## 2023-01-01 PROCEDURE — 99223 1ST HOSP IP/OBS HIGH 75: CPT | Mod: ,,, | Performed by: INTERNAL MEDICINE

## 2023-01-01 PROCEDURE — 83605 ASSAY OF LACTIC ACID: CPT | Mod: 91 | Performed by: HOSPITALIST

## 2023-01-01 PROCEDURE — 49083 ABD PARACENTESIS W/IMAGING: CPT

## 2023-01-01 PROCEDURE — 82248 BILIRUBIN DIRECT: CPT | Performed by: PHYSICIAN ASSISTANT

## 2023-01-01 PROCEDURE — 99233 PR SUBSEQUENT HOSPITAL CARE,LEVL III: ICD-10-PCS | Mod: GC,,, | Performed by: INTERNAL MEDICINE

## 2023-01-01 PROCEDURE — 80307 DRUG TEST PRSMV CHEM ANLYZR: CPT | Performed by: STUDENT IN AN ORGANIZED HEALTH CARE EDUCATION/TRAINING PROGRAM

## 2023-01-01 PROCEDURE — 86376 MICROSOMAL ANTIBODY EACH: CPT | Performed by: HOSPITALIST

## 2023-01-01 PROCEDURE — 84100 ASSAY OF PHOSPHORUS: CPT | Performed by: HOSPITALIST

## 2023-01-01 PROCEDURE — 63600175 PHARM REV CODE 636 W HCPCS: Mod: JZ,JG | Performed by: HOSPITALIST

## 2023-01-01 RX ORDER — SCOLOPAMINE TRANSDERMAL SYSTEM 1 MG/1
1 PATCH, EXTENDED RELEASE TRANSDERMAL
Status: DISCONTINUED | OUTPATIENT
Start: 2023-01-01 | End: 2023-01-01 | Stop reason: HOSPADM

## 2023-01-01 RX ORDER — NALOXONE HCL 0.4 MG/ML
0.02 VIAL (ML) INJECTION
Status: DISCONTINUED | OUTPATIENT
Start: 2023-01-01 | End: 2023-01-01

## 2023-01-01 RX ORDER — ONDANSETRON 2 MG/ML
8 INJECTION INTRAMUSCULAR; INTRAVENOUS EVERY 8 HOURS PRN
Status: DISCONTINUED | OUTPATIENT
Start: 2023-01-01 | End: 2023-01-01 | Stop reason: HOSPADM

## 2023-01-01 RX ORDER — MORPHINE SULFATE 2 MG/ML
2 INJECTION, SOLUTION INTRAMUSCULAR; INTRAVENOUS
Status: DISCONTINUED | OUTPATIENT
Start: 2023-01-01 | End: 2023-01-01

## 2023-01-01 RX ORDER — BISMUTH SUBSALICYLATE 525 MG/30ML
30 LIQUID ORAL DAILY PRN
Status: ON HOLD | COMMUNITY
End: 2023-01-01 | Stop reason: HOSPADM

## 2023-01-01 RX ORDER — GLYCOPYRROLATE 0.2 MG/ML
0.1 INJECTION INTRAMUSCULAR; INTRAVENOUS 3 TIMES DAILY PRN
Status: DISCONTINUED | OUTPATIENT
Start: 2023-01-01 | End: 2023-01-01 | Stop reason: HOSPADM

## 2023-01-01 RX ORDER — SODIUM CHLORIDE 0.9 % (FLUSH) 0.9 %
10 SYRINGE (ML) INJECTION EVERY 6 HOURS PRN
Status: DISCONTINUED | OUTPATIENT
Start: 2023-01-01 | End: 2023-01-01

## 2023-01-01 RX ORDER — MORPHINE SULFATE 1 MG/ML
0-10 INJECTION, SOLUTION INTRAVENOUS CONTINUOUS
Status: DISCONTINUED | OUTPATIENT
Start: 2023-01-01 | End: 2023-01-01 | Stop reason: HOSPADM

## 2023-01-01 RX ORDER — PROCHLORPERAZINE EDISYLATE 5 MG/ML
5 INJECTION INTRAMUSCULAR; INTRAVENOUS EVERY 6 HOURS PRN
Status: DISCONTINUED | OUTPATIENT
Start: 2023-01-01 | End: 2023-01-01 | Stop reason: HOSPADM

## 2023-01-01 RX ORDER — HALOPERIDOL 5 MG/ML
1 INJECTION INTRAMUSCULAR EVERY 4 HOURS PRN
Status: DISCONTINUED | OUTPATIENT
Start: 2023-01-01 | End: 2023-01-01 | Stop reason: HOSPADM

## 2023-01-01 RX ORDER — IBUPROFEN 200 MG
16 TABLET ORAL
Status: DISCONTINUED | OUTPATIENT
Start: 2023-01-01 | End: 2023-01-01

## 2023-01-01 RX ORDER — LACTULOSE 10 G/15ML
10 SOLUTION ORAL
Status: DISCONTINUED | OUTPATIENT
Start: 2023-01-01 | End: 2023-01-01 | Stop reason: ALTCHOICE

## 2023-01-01 RX ORDER — GLUCAGON 1 MG
1 KIT INJECTION
Status: DISCONTINUED | OUTPATIENT
Start: 2023-01-01 | End: 2023-01-01 | Stop reason: HOSPADM

## 2023-01-01 RX ORDER — LACTULOSE 10 G/15ML
20 SOLUTION ORAL EVERY 4 HOURS
Status: DISCONTINUED | OUTPATIENT
Start: 2023-01-01 | End: 2023-01-01

## 2023-01-01 RX ORDER — ONDANSETRON 2 MG/ML
4 INJECTION INTRAMUSCULAR; INTRAVENOUS EVERY 8 HOURS PRN
Status: DISCONTINUED | OUTPATIENT
Start: 2023-01-01 | End: 2023-01-01

## 2023-01-01 RX ORDER — MORPHINE SULFATE 1 MG/ML
0-15 INJECTION, SOLUTION INTRAVENOUS CONTINUOUS
Status: DISCONTINUED | OUTPATIENT
Start: 2023-01-01 | End: 2023-01-01 | Stop reason: HOSPADM

## 2023-01-01 RX ORDER — ALBUMIN HUMAN 250 G/1000ML
50 SOLUTION INTRAVENOUS
Status: COMPLETED | OUTPATIENT
Start: 2023-01-01 | End: 2023-01-01

## 2023-01-01 RX ORDER — PROCHLORPERAZINE EDISYLATE 5 MG/ML
5 INJECTION INTRAMUSCULAR; INTRAVENOUS EVERY 6 HOURS PRN
Status: DISCONTINUED | OUTPATIENT
Start: 2023-01-01 | End: 2023-01-01

## 2023-01-01 RX ORDER — PROCHLORPERAZINE EDISYLATE 5 MG/ML
10 INJECTION INTRAMUSCULAR; INTRAVENOUS EVERY 6 HOURS PRN
Status: DISCONTINUED | OUTPATIENT
Start: 2023-01-01 | End: 2023-01-01 | Stop reason: HOSPADM

## 2023-01-01 RX ORDER — LORAZEPAM 2 MG/ML
1 INJECTION INTRAMUSCULAR EVERY 4 HOURS PRN
Status: DISCONTINUED | OUTPATIENT
Start: 2023-01-01 | End: 2023-01-01 | Stop reason: HOSPADM

## 2023-01-01 RX ORDER — SODIUM CHLORIDE 0.9 % (FLUSH) 0.9 %
10 SYRINGE (ML) INJECTION
Status: DISCONTINUED | OUTPATIENT
Start: 2023-01-01 | End: 2023-01-01 | Stop reason: HOSPADM

## 2023-01-01 RX ORDER — TALC
6 POWDER (GRAM) TOPICAL NIGHTLY PRN
Status: DISCONTINUED | OUTPATIENT
Start: 2023-01-01 | End: 2023-01-01

## 2023-01-01 RX ORDER — LORAZEPAM 2 MG/ML
1 INJECTION INTRAMUSCULAR EVERY 4 HOURS PRN
Status: CANCELLED | OUTPATIENT
Start: 2023-01-01

## 2023-01-01 RX ORDER — ATROPINE SULFATE 10 MG/ML
2 SOLUTION/ DROPS OPHTHALMIC EVERY 4 HOURS PRN
Status: DISCONTINUED | OUTPATIENT
Start: 2023-01-01 | End: 2023-01-01 | Stop reason: HOSPADM

## 2023-01-01 RX ORDER — LACTULOSE 10 G/15ML
200 SOLUTION ORAL; RECTAL
Status: COMPLETED | OUTPATIENT
Start: 2023-01-01 | End: 2023-01-01

## 2023-01-01 RX ORDER — SCOLOPAMINE TRANSDERMAL SYSTEM 1 MG/1
1 PATCH, EXTENDED RELEASE TRANSDERMAL
Status: CANCELLED | OUTPATIENT
Start: 2023-01-01

## 2023-01-01 RX ORDER — LORAZEPAM 2 MG/ML
0.5 INJECTION INTRAMUSCULAR EVERY 30 MIN PRN
Status: DISCONTINUED | OUTPATIENT
Start: 2023-01-01 | End: 2023-01-01 | Stop reason: HOSPADM

## 2023-01-01 RX ORDER — ONDANSETRON 2 MG/ML
8 INJECTION INTRAMUSCULAR; INTRAVENOUS EVERY 8 HOURS PRN
Status: CANCELLED | OUTPATIENT
Start: 2023-01-01

## 2023-01-01 RX ORDER — LACTULOSE 10 G/15ML
200 SOLUTION ORAL; RECTAL EVERY 6 HOURS
Status: DISCONTINUED | OUTPATIENT
Start: 2023-01-01 | End: 2023-01-01

## 2023-01-01 RX ORDER — IBUPROFEN 200 MG
24 TABLET ORAL
Status: DISCONTINUED | OUTPATIENT
Start: 2023-01-01 | End: 2023-01-01

## 2023-01-01 RX ORDER — MORPHINE SULFATE 2 MG/ML
2 INJECTION, SOLUTION INTRAMUSCULAR; INTRAVENOUS
Status: DISCONTINUED | OUTPATIENT
Start: 2023-01-01 | End: 2023-01-01 | Stop reason: HOSPADM

## 2023-01-01 RX ORDER — FAMOTIDINE 20 MG/1
20 TABLET, FILM COATED ORAL DAILY
Status: DISCONTINUED | OUTPATIENT
Start: 2023-01-01 | End: 2023-01-01

## 2023-01-01 RX ORDER — PROMETHAZINE HYDROCHLORIDE 25 MG/1
25 TABLET ORAL EVERY 6 HOURS PRN
Status: DISCONTINUED | OUTPATIENT
Start: 2023-01-01 | End: 2023-01-01

## 2023-01-01 RX ORDER — HYDROCODONE BITARTRATE AND ACETAMINOPHEN 500; 5 MG/1; MG/1
TABLET ORAL
Status: DISCONTINUED | OUTPATIENT
Start: 2023-01-01 | End: 2023-01-01 | Stop reason: HOSPADM

## 2023-01-01 RX ADMIN — IOHEXOL 75 ML: 350 INJECTION, SOLUTION INTRAVENOUS at 03:08

## 2023-01-01 RX ADMIN — ALBUMIN (HUMAN) 25 G: 12.5 SOLUTION INTRAVENOUS at 12:08

## 2023-01-01 RX ADMIN — CEFTRIAXONE 1 G: 1 INJECTION, POWDER, FOR SOLUTION INTRAMUSCULAR; INTRAVENOUS at 03:08

## 2023-01-01 RX ADMIN — LACTULOSE 200 G: 10 SOLUTION ORAL at 10:08

## 2023-01-01 RX ADMIN — LORAZEPAM 1 MG: 2 INJECTION INTRAMUSCULAR; INTRAVENOUS at 07:08

## 2023-01-01 RX ADMIN — THIAMINE HYDROCHLORIDE 500 MG: 100 INJECTION, SOLUTION INTRAMUSCULAR; INTRAVENOUS at 03:08

## 2023-01-01 RX ADMIN — THIAMINE HYDROCHLORIDE 500 MG: 100 INJECTION, SOLUTION INTRAMUSCULAR; INTRAVENOUS at 10:08

## 2023-01-01 RX ADMIN — LACTULOSE 200 G: 10 SOLUTION ORAL at 04:08

## 2023-01-01 RX ADMIN — GLYCOPYRROLATE 0.1 MG: 0.2 INJECTION INTRAMUSCULAR; INTRAVENOUS at 05:08

## 2023-01-01 RX ADMIN — THIAMINE HYDROCHLORIDE 500 MG: 100 INJECTION, SOLUTION INTRAMUSCULAR; INTRAVENOUS at 12:08

## 2023-01-01 RX ADMIN — PHYTONADIONE 10 MG: 10 INJECTION, EMULSION INTRAMUSCULAR; INTRAVENOUS; SUBCUTANEOUS at 10:08

## 2023-01-01 RX ADMIN — LORAZEPAM 1 MG: 2 INJECTION INTRAMUSCULAR; INTRAVENOUS at 12:08

## 2023-01-01 RX ADMIN — LACTULOSE 20 G: 20 SOLUTION ORAL at 02:08

## 2023-01-01 RX ADMIN — Medication 13 MG/HR: at 08:08

## 2023-01-01 RX ADMIN — Medication 2 MG/HR: at 12:08

## 2023-01-01 RX ADMIN — Medication 10.5 MG/HR: at 10:08

## 2023-01-01 RX ADMIN — LACTULOSE 20 G: 20 SOLUTION ORAL at 01:08

## 2023-01-01 RX ADMIN — IOHEXOL 75 ML: 350 INJECTION, SOLUTION INTRAVENOUS at 06:08

## 2023-01-01 RX ADMIN — GLYCOPYRROLATE 0.1 MG: 0.2 INJECTION INTRAMUSCULAR; INTRAVENOUS at 03:08

## 2023-01-01 RX ADMIN — MORPHINE SULFATE 2 MG: 2 INJECTION, SOLUTION INTRAMUSCULAR; INTRAVENOUS at 08:08

## 2023-01-01 RX ADMIN — LACTULOSE 20 G: 20 SOLUTION ORAL at 10:08

## 2023-01-01 RX ADMIN — LACTULOSE 200 G: 10 SOLUTION ORAL at 06:08

## 2023-01-01 RX ADMIN — VANCOMYCIN HYDROCHLORIDE 1000 MG: 1 INJECTION, POWDER, LYOPHILIZED, FOR SOLUTION INTRAVENOUS at 10:08

## 2023-01-01 RX ADMIN — LORAZEPAM 1 MG: 2 INJECTION INTRAMUSCULAR; INTRAVENOUS at 03:08

## 2023-01-01 RX ADMIN — SCOPALAMINE 1 PATCH: 1 PATCH, EXTENDED RELEASE TRANSDERMAL at 07:08

## 2023-01-01 RX ADMIN — SODIUM CHLORIDE, POTASSIUM CHLORIDE, SODIUM LACTATE AND CALCIUM CHLORIDE 1000 ML: 600; 310; 30; 20 INJECTION, SOLUTION INTRAVENOUS at 11:08

## 2023-01-01 RX ADMIN — LORAZEPAM 1 MG: 2 INJECTION INTRAMUSCULAR; INTRAVENOUS at 11:08

## 2023-01-01 RX ADMIN — LACTULOSE 20 G: 20 SOLUTION ORAL at 06:08

## 2023-01-01 RX ADMIN — ALBUMIN (HUMAN) 25 G: 12.5 SOLUTION INTRAVENOUS at 03:08

## 2023-01-01 RX ADMIN — PIPERACILLIN SODIUM AND TAZOBACTAM SODIUM 4.5 G: 4; .5 INJECTION, POWDER, FOR SOLUTION INTRAVENOUS at 12:08

## 2023-01-01 RX ADMIN — LORAZEPAM 0.5 MG: 2 INJECTION INTRAMUSCULAR; INTRAVENOUS at 10:08

## 2023-01-01 RX ADMIN — PIPERACILLIN SODIUM AND TAZOBACTAM SODIUM 4.5 G: 4; .5 INJECTION, POWDER, FOR SOLUTION INTRAVENOUS at 06:08

## 2023-01-01 RX ADMIN — ALBUMIN (HUMAN) 50 G: 12.5 SOLUTION INTRAVENOUS at 04:08

## 2023-01-01 RX ADMIN — RIFAXIMIN 550 MG: 550 TABLET ORAL at 08:08

## 2023-01-01 RX ADMIN — Medication 9 MG/HR: at 12:08

## 2023-01-01 RX ADMIN — FAMOTIDINE 20 MG: 20 TABLET, FILM COATED ORAL at 02:08

## 2023-01-01 RX ADMIN — RIFAXIMIN 550 MG: 550 TABLET ORAL at 02:08

## 2023-01-23 PROBLEM — Z12.31 ENCOUNTER FOR SCREENING MAMMOGRAM FOR MALIGNANT NEOPLASM OF BREAST: Status: ACTIVE | Noted: 2023-01-01

## 2023-01-23 PROBLEM — H53.8 BLURRY VISION, BILATERAL: Status: ACTIVE | Noted: 2023-01-01

## 2023-01-23 PROBLEM — H04.203 WATERY EYES: Status: ACTIVE | Noted: 2023-01-01

## 2023-01-24 PROBLEM — R73.09 OTHER ABNORMAL GLUCOSE: Status: ACTIVE | Noted: 2023-01-01

## 2023-01-24 PROBLEM — K90.89 OTHER INTESTINAL MALABSORPTION: Status: ACTIVE | Noted: 2023-01-01

## 2023-01-24 PROBLEM — Z78.0 ASYMPTOMATIC MENOPAUSAL STATE: Status: ACTIVE | Noted: 2023-01-01

## 2023-02-07 PROBLEM — F10.10 ALCOHOL ABUSE: Status: ACTIVE | Noted: 2023-01-01

## 2023-03-07 PROBLEM — M81.0 AGE-RELATED OSTEOPOROSIS WITHOUT CURRENT PATHOLOGICAL FRACTURE: Status: ACTIVE | Noted: 2023-01-01

## 2023-07-03 PROBLEM — Z12.31 ENCOUNTER FOR SCREENING MAMMOGRAM FOR MALIGNANT NEOPLASM OF BREAST: Status: RESOLVED | Noted: 2023-01-01 | Resolved: 2023-01-01

## 2023-07-03 PROBLEM — M54.9 COSTOVERTEBRAL ANGLE TENDERNESS: Status: RESOLVED | Noted: 2017-09-18 | Resolved: 2023-01-01

## 2023-07-03 PROBLEM — H53.8 BLURRY VISION, BILATERAL: Status: RESOLVED | Noted: 2023-01-01 | Resolved: 2023-01-01

## 2023-07-03 PROBLEM — D64.9 SYMPTOMATIC ANEMIA: Status: ACTIVE | Noted: 2023-01-01

## 2023-07-03 PROBLEM — B18.2 CHRONIC HEPATITIS C WITHOUT HEPATIC COMA: Status: ACTIVE | Noted: 2023-01-01

## 2023-07-03 PROBLEM — R73.09 OTHER ABNORMAL GLUCOSE: Status: RESOLVED | Noted: 2023-01-01 | Resolved: 2023-01-01

## 2023-07-03 PROBLEM — H04.203 WATERY EYES: Status: RESOLVED | Noted: 2023-01-01 | Resolved: 2023-01-01

## 2023-07-03 PROBLEM — E87.6 HYPOKALEMIA: Status: ACTIVE | Noted: 2023-01-01

## 2023-07-06 PROBLEM — E87.6 HYPOKALEMIA: Status: RESOLVED | Noted: 2023-01-01 | Resolved: 2023-01-01

## 2023-07-06 PROBLEM — D64.9 SYMPTOMATIC ANEMIA: Status: RESOLVED | Noted: 2023-01-01 | Resolved: 2023-01-01

## 2023-08-03 PROBLEM — R53.1 WEAKNESS: Status: ACTIVE | Noted: 2023-01-01

## 2023-08-03 PROBLEM — R26.9 GAIT ABNORMALITY: Status: ACTIVE | Noted: 2023-01-01

## 2023-08-03 PROBLEM — R26.89 IMPAIRMENT OF BALANCE: Status: ACTIVE | Noted: 2023-01-01

## 2023-08-03 PROBLEM — R29.898 IMPAIRED FLEXIBILITY OF LOWER EXTREMITY: Status: ACTIVE | Noted: 2023-01-01

## 2023-08-03 PROBLEM — R68.89 DECREASED FUNCTIONAL ACTIVITY TOLERANCE: Status: ACTIVE | Noted: 2023-01-01

## 2023-08-03 PROBLEM — R68.89 DECREASED STRENGTH, ENDURANCE, AND MOBILITY: Status: ACTIVE | Noted: 2023-01-01

## 2023-08-03 PROBLEM — Z91.81 AT MAXIMUM RISK FOR FALL: Status: ACTIVE | Noted: 2023-01-01

## 2023-08-03 PROBLEM — R53.1 DECREASED STRENGTH, ENDURANCE, AND MOBILITY: Status: ACTIVE | Noted: 2023-01-01

## 2023-08-03 PROBLEM — Z74.09 DECREASED STRENGTH, ENDURANCE, AND MOBILITY: Status: ACTIVE | Noted: 2023-01-01

## 2023-08-03 NOTE — PLAN OF CARE
"[facility]   Physical Therapy Initial Evaluation     Date: 8/3/2023   Name: Syl Basilio  Clinic Number: [id number]    Therapy Diagnosis:   Encounter Diagnoses   Name Primary?    Weakness Yes    At maximum risk for fall     Decreased functional activity tolerance     Decreased strength, endurance, and mobility     Impaired flexibility of lower extremity     Impairment of balance     Gait abnormality      Physician: Coleen Talbert MD    Physician Orders: PT Eval and Treat (C/o b/l upper and lower extremity weakness x 6 months, progressively worsening, RLE 4/5 strength)  Medical Diagnosis from Referral: R53.1 (ICD-10-CM) - Weakness   Evaluation Date: 8/3/2023  Authorization Period Expiration: 7/2/2024  Plan of Care Expiration: 9/28/2023  Progress Note Due: 8/31/2023  Visit # / Visits authorized: 1/ 1   FOTO: 1/3    Precautions: Standard, blood and bodily fluid, and Fall     Time In: 13:45  Time Out: 14:30  Total Appointment Time (timed & untimed codes): 45 minutes      SUBJECTIVE     Date of onset: 2 months ago    History of current condition - Hazel reports: she referred from the hospital, she doesn't know why she was in the hospital. She was having weakness in her legs and arms. She has been feeling like she is going to fall, but catches herself with her arms. She reports it started about 2 months ago, and she needed fluids, blood and potassium. She had blood loss from having teeth pulled. She reports osteoporosis. She isn't sure if it is kidney or liver related. She has jaundice. She reports feeling "jerking" in her muscles. Her symptoms started on one side, she reports it is now both sides.    Falls: none    Imaging, none    Prior Therapy: None  Social History: 1 story; 1 step; lives with their spouse  Occupation: Retired  Prior Level of Function: No limitation  Current Level of Function: Limited household ambulator, unable to stand for 1 minute    Pain:  No pain    Patients " goals: to walk again     Medical History:   Past Medical History:   Diagnosis Date    Anemia     Elevated liver enzymes     Hepatitis     hep C ab +    History of blood transfusion     in 80s    Seasonal allergies        Surgical History:   Syl Basilio  has a past surgical history that includes  section; Hysterectomy; Esophagogastroduodenoscopy (N/A, 2023); and Colonoscopy (N/A, 2023).    Medications:   Syl has a current medication list which includes the following prescription(s): alendronate, cyanocobalamin (vitamin b-12), ergocalciferol, furosemide, loratadine, methylprednisolone, ondansetron, and spironolactone.    Allergies:   Review of patient's allergies indicates:   Allergen Reactions    Codeine Nausea And Vomiting          OBJECTIVE     Posture: Forward head, rounded shoulders, increased thoracic kyphosis  Palpation: no TTP    Strength:   L/E MMT Right Left Pain/Dysfunction with Movement   Hip Flexion 3/5 3/5    Hip Extension 3/5 3/5    Hip Abduction 3-/5 3-/5    Hip Adduction 3-/5 3-/5    Knee Flexion 3/5 3/5    Knee Extension 3-/5 3-/5    Ankle DF 3-/5 3-/5    Ankle PF 3/5 3/5        Flexibility: Hamstring Tightness, Quadriceps Tightness    Gait Analysis:With AD.  Device Used -  Front - wheeled walker SBA - patient high fall risk  Deviations: very slow, short steps, minimal height of foot off of the floor, decreased knee flexion, decreased hip flexion    TU seconds with use of Front Wheeled Walker    30 second sit-to-stand test (without U/E support): 3     Balance: Modified CTSIB - 1 - 30 seconds, mild sway; 2 - 30 seconds, moderate sway; 3 - 30 seconds, moderate sway; 4 - 6 seconds LOB and opened eyes    Standing Tolerance: 47 seconds       Limitation/Restriction for FOTO TBD Survey    Therapist reviewed FOTO scores for Syl Basilio on 8/3/2023.   FOTO documents entered into EPIC - see Media section.    Limitation Score: TBD%         TREATMENT     Total  Treatment time (time-based codes) separate from Evaluation: 15 minutes       therapeutic activities to improve functional performance for 10  minutes, including:  Education on use of walker, decreasing trip hazards within the home, increasing activity within her tolerance.    gait training to improve functional mobility and safety for 5  minutes, including:  Forward Wheeled Walker - educating on use, height of handles, paying attention to surroundings      PATIENT EDUCATION AND HOME EXERCISES     Education provided:   - PT discussed with patient and spouse, her diagnosis, her prognosis, her POC, and her HEP.  - PT discussed with patient and spouse, use of walker, decreasing trip hazards within the home, increasing activity within her tolerance.    Written Home Exercises Provided: PT discussed increasing activity within her tolerance.    ASSESSMENT     Syl is a 67 y.o. female referred to outpatient Physical Therapy with a medical diagnosis of R53.1 (ICD-10-CM) - Weakness. Patient presents with signs and symptoms consistent with severe debilitation, minimal tolerance to functional activities, decreased strength, endurance, and mobility, decreased flexibility in lower extremities, impairment of balance, gait abnormalities, and high fall risk.    Patient prognosis is Guarded.   Patient will benefit from skilled outpatient Physical Therapy to address the deficits stated above and in the chart below, provide patient /family education, and to maximize patientt's level of independence.     Plan of care discussed with patient: Yes  Patient's spiritual, cultural and educational needs considered and patient is agreeable to the plan of care and goals as stated below:     Anticipated Barriers for therapy: other undiagnosed co-morbidities    Medical Necessity is demonstrated by the following  History  Co-morbidities and personal factors that may impact the plan of care Co-morbidities:   advanced age and anemia,  hepatitis    Personal Factors:   age  coping style  lifestyle  attitudes     moderate   Examination  Body Structures and Functions, activity limitations and participation restrictions that may impact the plan of care Body Regions:   lower extremities  upper extremities  trunk    Body Systems:    strength  gross coordinated movement  balance  gait  transfers  motor control    Participation Restrictions:   none    Activity limitations:   Learning and applying knowledge  no deficits    General Tasks and Commands  no deficits    Communication  communicating with/receiving spoken language  Patient is hard of hearing and also speaks at a very low volume    Mobility  lifting and carrying objects  fine hand use (grasping/picking up)  walking    Self care  no deficits    Domestic Life  shopping  cooking  doing house work (cleaning house, washing dishes, laundry)  assisting others    Interactions/Relationships  no deficits    Life Areas  no deficits    Community and Social Life  community life  recreation and leisure         moderate   Clinical Presentation unstable clinical presentation with unpredictable characteristics high   Decision Making/ Complexity Score: moderate     Goals:  Short Term Goals: 4 weeks   Patient will report understanding and compliance with HEP.  Patient will demonstrate increased tolerance to activities by participating in full 45 minute PT session with less than 10 breaks totaling less than 10 minutes.  Patient will decrease the amount of time required to complete the Timed Up and Go from 139 seconds to =/< 100 seconds, in order to decrease her risk for falls.    Long Term Goals: 8 weeks   Patient will report increased tolerance to home activities, such as walking for more than 5 minutes.  Patient will demonstrate increased strength through manual muscle testing with a score =/> 4/5 throughout the lower extremities.  Patient will increase her repetitions of sit to stands during the 30 second sit to  stand test from 3 to at least 8, in order to decrease she risk for falls.    PLAN   Plan of care Certification: 8/3/2023 to 9/28/2023.    Outpatient Physical Therapy 2 times weekly for 8 weeks to include the following interventions: Gait Training, Neuromuscular Re-ed, Patient Education, Therapeutic Activities, and Therapeutic Exercise.     Giselle Dacosta, PT      I CERTIFY THE NEED FOR THESE SERVICES FURNISHED UNDER THIS PLAN OF TREATMENT AND WHILE UNDER MY CARE   Physician's comments:     Physician's Signature: ___________________________________________________

## 2023-08-05 NOTE — ED PROVIDER NOTES
Encounter Date: 2023       History     Chief Complaint   Patient presents with    Fatigue     67-year-old female with history of hepatitis, elevated liver enzymes, history of alcohol abuse, presenting with a few weeks of generalized weakness.  Daughter states that a few months ago, patient had similar symptoms, and required a blood transfusion for anemia.  Patient also reports that she is noticed some abdominal swelling.  Denies any pain.  No nausea, vomiting, diarrhea.  No fever.  No other complaints.      Review of patient's allergies indicates:   Allergen Reactions    Codeine Nausea And Vomiting     Past Medical History:   Diagnosis Date    Anemia     Elevated liver enzymes     Hepatitis     hep C ab +    History of blood transfusion     in 80s    Seasonal allergies      Past Surgical History:   Procedure Laterality Date     SECTION      COLONOSCOPY N/A 2023    Procedure: COLONOSCOPY;  Surgeon: Analisa Bhandari MD;  Location: [facility];  Service: Endoscopy;  Laterality: N/A;    ESOPHAGOGASTRODUODENOSCOPY N/A 2023    Procedure: EGD (ESOPHAGOGASTRODUODENOSCOPY);  Surgeon: Analisa Bhandari MD;  Location: [facility];  Service: Endoscopy;  Laterality: N/A;    HYSTERECTOMY       Family History   Problem Relation Age of Onset    Diabetes Mother     Lung cancer Father     Diabetes Maternal Grandmother     Diabetes Daughter     Colon cancer Neg Hx      Social History     Tobacco Use    Smoking status: Never    Smokeless tobacco: Never   Substance Use Topics    Alcohol use: Yes     Comment: 2 times per month 4 beers    Drug use: No     Review of Systems   Constitutional:  Positive for fatigue. Negative for fever.   HENT:  Negative for sore throat.    Respiratory:  Negative for shortness of breath.    Cardiovascular:  Negative for chest pain.   Gastrointestinal:  Positive for abdominal distention. Negative for abdominal pain, diarrhea, nausea and vomiting.   Genitourinary:  Negative for dysuria.    Musculoskeletal:  Negative for back pain.   Skin:  Negative for rash.   Neurological:  Negative for weakness.   Hematological:  Does not bruise/bleed easily.       Physical Exam     Initial Vitals [08/05/23 1654]   BP Pulse Resp Temp SpO2   104/65 (!) 113 17 98.5 °F (36.9 °C) 100 %      MAP       --         Physical Exam    Nursing note and vitals reviewed.  Constitutional: She appears well-developed. She is not diaphoretic. No distress.   HENT:   Head: Normocephalic.   Eyes: Pupils are equal, round, and reactive to light.   Neck:   Normal range of motion.  Cardiovascular:            No murmur heard.  Pulmonary/Chest: No respiratory distress. She has no wheezes. She has no rales.   Abdominal: Abdomen is soft.   Mild abdominal distention.  No tenderness to palpation.  No rebound or guarding.  No CVA tenderness bilaterally.   Musculoskeletal:         General: No edema.      Cervical back: Normal range of motion.     Neurological: She is alert and oriented to person, place, and time.   Skin: Skin is warm.   Psychiatric: She has a normal mood and affect.         ED Course   Procedures  Labs Reviewed   CBC W/ AUTO DIFFERENTIAL - Abnormal; Notable for the following components:       Result Value    RBC 1.79 (*)     Hemoglobin 5.8 (*)     Hematocrit 18.6 (*)      (*)     MCH 32.4 (*)     MCHC 31.2 (*)     RDW 24.9 (*)     Platelets 77 (*)     Immature Granulocytes 0.7 (*)     Platelet Estimate Decreased (*)     All other components within normal limits    Narrative:       Hemoglobin and Hematocrit  critical result(s) called and verbal   readback obtained from Judy Madison RN by LUDWIG 08/05/2023 17:49   COMPREHENSIVE METABOLIC PANEL - Abnormal; Notable for the following components:    Sodium 132 (*)     Potassium 3.3 (*)     Calcium 7.7 (*)     Albumin 1.3 (*)     Total Bilirubin 6.2 (*)     AST 94 (*)     All other components within normal limits   URINALYSIS, REFLEX TO URINE CULTURE - Abnormal; Notable for the  following components:    Color, UA Orange (*)     Appearance, UA Cloudy (*)     Specific Gravity, UA >=1.030 (*)     Ketones, UA Trace (*)     Bilirubin (UA) 3+ (*)     Occult Blood UA Trace (*)     Urobilinogen, UA >=8.0 (*)     All other components within normal limits    Narrative:     Specimen Source->Urine   APTT - Abnormal; Notable for the following components:    aPTT 45.1 (*)     All other components within normal limits   PROTIME-INR - Abnormal; Notable for the following components:    Prothrombin Time 23.9 (*)     INR 2.4 (*)     All other components within normal limits   URINALYSIS MICROSCOPIC - Abnormal; Notable for the following components:    WBC, UA 6 (*)     Bacteria Many (*)     All other components within normal limits    Narrative:     Specimen Source->Urine   CBC W/ AUTO DIFFERENTIAL - Abnormal; Notable for the following components:    RBC 2.47 (*)     Hemoglobin 7.8 (*)     Hematocrit 23.2 (*)     MCH 31.6 (*)     RDW 23.2 (*)     Platelets 51 (*)     Immature Granulocytes 0.7 (*)     All other components within normal limits   LIPASE   TYPE & SCREEN   PREPARE RBC SOFT     EKG Readings: (Independently Interpreted)   Initial Reading: No STEMI. Rhythm: Sinus Tachycardia. Heart Rate: 105. Ectopy: No Ectopy. Conduction: Normal.       Imaging Results               CT Abdomen Pelvis With Contrast (Final result)  Result time 08/05/23 18:50:28      Final result by Henrique Romo MD (08/05/23 18:50:28)                   Impression:      Cirrhotic liver and sequela of portal hypertension.  Large volume intra-abdominal and intrapelvic ascites.    Enlarged and heterogeneous spleen.  Component of infarction is not excluded.    This report was flagged in Epic as abnormal.      Electronically signed by: Henrique Romo  Date:    08/05/2023  Time:    18:50               Narrative:    EXAMINATION:  CT ABDOMEN PELVIS WITH CONTRAST    CLINICAL HISTORY:  Abdominal pain, acute,  nonlocalized;    TECHNIQUE:  Low dose axial images, sagittal and coronal reformations were obtained from the lung bases to the pubic symphysis following the IV administration of 75 mL of Omnipaque 350 .  Oral contrast was not administered.    COMPARISON:  CT 10/03/2021    FINDINGS:  Abdomen:    - Lower thorax:Coronary artery atherosclerosis.    - Lung bases: Small left pleural effusion with adjacent atelectasis.    - Liver: Cirrhotic.  No discrete mass.  Large volume intra-abdominal ascites.    - Gallbladder: No calcified gallstones.    - Bile Ducts: No evidence of intra or extra hepatic biliary ductal dilation.    - Spleen: Enlarged and heterogeneous spleen.  Splenic varices.    - Kidneys: No mass or hydronephrosis.    - Adrenals: Unremarkable.    - Pancreas: No mass or peripancreatic fat stranding.    - Retroperitoneum:  No significant adenopathy.    - Vascular: Main portal vein appears patent.  Mild moderate multifocal atherosclerosis of the abdominal aorta and its branches.    - Abdominal wall:  Unremarkable.    Pelvis:    Large volume intrapelvic ascites.  Fluid extending into a right inguinal hernia defect.  Left posterolateral bladder diverticulum.    Bowel/Mesentery:    No evidence of bowel obstruction or inflammation.  Gastric varices.  Small hiatal hernia.    Bones:  No acute osseous abnormality and no suspicious lytic or blastic lesion.                                       Medications   0.9%  NaCl infusion (for blood administration) (has no administration in time range)   iohexoL (OMNIPAQUE 350) injection 75 mL (75 mLs Intravenous Given 8/5/23 1801)     Medical Decision Making:   Differential Diagnosis:   DDX: Generalized weakness - r/o infection, significant anemia, ACS, arrhythmia, electrolyte abnormality, VERNON, metabolic abnormality. Unlikely ICH/mass given nonfocal neuro exam, no concerning history, but will consider if change in exam.  Given patient's worsening abdominal distention and her history of  liver disease, will perform CT scan to assess for extent of ascites and possible liver pathology.  DX: BMP, CBC, EKG. CXR, UA. CTAP. NCHCT if change in neuro exam/symptoms.  TX: Analgesia PRN. Treatment/consult as indicated by studies.   DISPO: Pending studies.                      ED Course as of 08/05/23 2358   Sat Aug 05, 2023   1930 Patient denies any blood in her stool, or dark stools.  No blood in her urine. [NB]   1939 Patient has no abdominal tenderness to palpation.  Very low suspicion for SBP.  Patient likely fatigued from her anemia.  Will transfuse 2 units, recheck CBC, and have patient follow up with GI on Monday. [NB]   1942 On recent hospitalization patient has cirrhosis was noted, and she had a GI appointment scheduled for 8/24/23.  Will attempt to get patient expedited follow-up.  She was anemic at that time and had a scope that showed no bleeding. [NB]   2358 Patient reports feeling much better after transfusion.  Vitals within normal limits.  No abdominal pain, and no abdominal tenderness to palpation.  Will discharge with instructions follow-up with GI. [NB]      ED Course User Index  [NB] Michael Black MD                 Clinical Impression:   Final diagnoses:  [R53.1] Generalized weakness  [R18.8] Other ascites  [K74.60] Hepatic cirrhosis, unspecified hepatic cirrhosis type, unspecified whether ascites present (Primary)        ED Disposition Condition    Discharge Stable          ED Prescriptions    None       Follow-up Information       Follow up With Specialties Details Why Contact Info    Jeramie Self MD Internal Medicine Schedule an appointment as soon as possible for a visit in 2 days  [address]  [place] [address]  [phone]      [facility] - Emergency Dept Emergency Medicine  If symptoms worsen [address] [facility] [address]  [phone]             Michael Black MD  08/05/23 1713       Michael Black MD  08/05/23 6069       Wayne  Michael EAST MD  08/05/23 0267

## 2023-08-05 NOTE — ED TRIAGE NOTES
67 y.o. female presents to ER ED 05/ED 05   Chief Complaint   Patient presents with    Fatigue   .   C/o generalized weakness, vomiting, abd swelling

## 2023-08-07 PROBLEM — K74.69 DECOMPENSATED CIRRHOSIS RELATED TO HEPATITIS C VIRUS (HCV): Status: ACTIVE | Noted: 2023-01-01

## 2023-08-07 PROBLEM — E87.20 LACTIC ACIDOSIS: Status: ACTIVE | Noted: 2023-01-01

## 2023-08-07 PROBLEM — B19.20 DECOMPENSATED CIRRHOSIS RELATED TO HEPATITIS C VIRUS (HCV): Status: ACTIVE | Noted: 2023-01-01

## 2023-08-07 PROBLEM — F10.20 ALCOHOL DEPENDENCE: Chronic | Status: ACTIVE | Noted: 2023-01-01

## 2023-08-07 PROBLEM — R18.8 OTHER ASCITES: Status: ACTIVE | Noted: 2023-01-01

## 2023-08-07 PROBLEM — K76.82 HEPATIC ENCEPHALOPATHY: Status: ACTIVE | Noted: 2023-01-01

## 2023-08-07 NOTE — Clinical Note
Diagnosis: Ascites due to alcoholic cirrhosis [[id number]]   Admitting Provider:: JOSEPH STEWART [[id number]]   Future Attending Provider: JOSEPH STEWART [[id number]]   Reason for IP Medical Treatment  (Clinical interventions that can only be accomplished in the IP setting? ) :: hyperammonemia and decompensated cirrhosis   I certify that Inpatient services for greater than or equal to 2 midnights are medically necessary:: Yes   Plans for Post-Acute care--if anticipated (pick the single best option):: A. No post acute care anticipated at this time

## 2023-08-07 NOTE — ED NOTES
Patient identifiers for Syl Basilio 67 y.o. female checked and correct.  Past Medical History:   Diagnosis Date    Anemia     Elevated liver enzymes     Hepatitis     hep C ab +    History of blood transfusion     in 80s    Seasonal allergies      Allergies reported:   Review of patient's allergies indicates:   Allergen Reactions    Codeine Nausea And Vomiting         LOC: Patient is awake. Oriented to person only.   APPEARANCE: Pt is thin, frail.   HEENT:   SKIN: The skin is warm and dry. Patient has delayed skin turgor, dry mucus membranes. Color appears jaundiced.   MUSCULOSKELETAL: Patient is moving all extremities well with no obvious deformities. 3+ pulses palpated in all extremities.   RESPIRATORY: Airway is open and patent. Respirations are spontaneous and non-labored with normal effort and rate.  CARDIAC: Patient has a regular rate and rhythm. Sinus tach on cardiac monitor. No peripheral edema noted.   ABDOMEN: Distended, non tender to palpation.   NEUROLOGICAL: pupils 3 mm, PERRL. Facial expression is symmetrical. Hand grasps are equal bilaterally. Normal sensation in all extremities when touched with finger.

## 2023-08-07 NOTE — ED PROVIDER NOTES
Source of History  patient, family, EMR, and EMS    Chief Complaint    Fatigue (Seen at [facility] on Saturday hx of cirrhosis )      History of Present Illness    Syl Basilio is a 67 y.o. female presenting with concern for general weakness.  History of cirrhosis.  Unclear etiology - ?ethanol use vs hepatitis as documented?.  From home.  Recent hospital visit where she received 2 units PRBCs for anemia and was discharged home.  Daughter states that she has had progressive decline.  No prior paracentesis.  No prior treatment for the cirrhosis.  Was a long-term alcohol user but for 2 months had stopped because of this diagnosis, especially after she had all of her teeth removed and ended up with bleeding in her gums.  No bowel movements recently and low urine output according to daughter.  History is limited per patient.    Review of Systems    As per HPI and below:  Review of Systems:    Pertinent information obtained as above.  Otherwise limited due to: acuity and change in mental status     Past History    As per HPI and below:  Past Medical History:   Diagnosis Date    Anemia     Elevated liver enzymes     Hepatitis     hep C ab +    History of blood transfusion     in 80s    Seasonal allergies        Past Surgical History:   Procedure Laterality Date     SECTION      COLONOSCOPY N/A 2023    Procedure: COLONOSCOPY;  Surgeon: Analisa Bhandari MD;  Location: [facility];  Service: Endoscopy;  Laterality: N/A;    ESOPHAGOGASTRODUODENOSCOPY N/A 2023    Procedure: EGD (ESOPHAGOGASTRODUODENOSCOPY);  Surgeon: Analisa Bhandari MD;  Location: [facility];  Service: Endoscopy;  Laterality: N/A;    HYSTERECTOMY         Social History     Socioeconomic History    Marital status:    Tobacco Use    Smoking status: Never    Smokeless tobacco: Never   Substance and Sexual Activity    Alcohol use: Yes     Comment: 2 times per month 4 beers    Drug use: No   Social History Narrative    ** Merged History  Encounter **          Social Determinants of Health     Financial Resource Strain: Low Risk  (7/5/2023)    Overall Financial Resource Strain (CARDIA)     Difficulty of Paying Living Expenses: Not hard at all   Food Insecurity: No Food Insecurity (7/5/2023)    Hunger Vital Sign     Worried About Running Out of Food in the Last Year: Never true     Ran Out of Food in the Last Year: Never true   Transportation Needs: No Transportation Needs (7/5/2023)    PRAPARE - Transportation     Lack of Transportation (Medical): No     Lack of Transportation (Non-Medical): No   Physical Activity: Inactive (7/5/2023)    Exercise Vital Sign     Days of Exercise per Week: 0 days     Minutes of Exercise per Session: 0 min   Social Connections: Moderately Integrated (7/5/2023)    Social Connection and Isolation Panel [NHANES]     Frequency of Communication with Friends and Family: More than three times a week     Frequency of Social Gatherings with Friends and Family: Once a week     Attends Orthodoxy Services: More than 4 times per year     Active Member of Clubs or Organizations: No     Attends Club or Organization Meetings: Never     Marital Status:    Housing Stability: Unknown (7/5/2023)    Housing Stability Vital Sign     Unable to Pay for Housing in the Last Year: No     Unstable Housing in the Last Year: No       Family History   Problem Relation Age of Onset    Diabetes Mother     Lung cancer Father     Diabetes Maternal Grandmother     Diabetes Daughter     Colon cancer Neg Hx        Review of patient's allergies indicates:   Allergen Reactions    Codeine Nausea And Vomiting       No current facility-administered medications on file prior to encounter.     Current Outpatient Medications on File Prior to Encounter   Medication Sig Dispense Refill    bismuth subsalicylate (PEPTO-BISMOL) 262 mg/15 mL suspension Take 30 mLs by mouth daily as needed for Indigestion.      CYANOCOBALAMIN, VITAMIN B-12, ORAL Take 1,000 mcg by  "mouth once daily.      DULCOLAX, BISACODYL, ORAL Take 1 tablet by mouth daily as needed (Constipation).      loratadine (CLARITIN) 10 mg tablet Take 10 mg by mouth daily as needed for Allergies.      ondansetron (ZOFRAN) 4 MG tablet Take 4 mg by mouth every 8 (eight) hours as needed.      polyethylene glycol 400 (VISINE DRY EYE RELIEF OPHT) Place 1 drop into both eyes daily as needed (Dry eye).      [DISCONTINUED] alendronate (FOSAMAX) 70 MG tablet Take 1 tablet (70 mg total) by mouth every 7 days. 4 tablet 11    [DISCONTINUED] ergocalciferol (ERGOCALCIFEROL) 50,000 unit Cap Take 1 capsule (50,000 Units total) by mouth every 7 days. Once done switch to OTC Vit D 2000 U, orally, per day. 24 capsule 0    [DISCONTINUED] furosemide (LASIX) 20 MG tablet Take 1 tablet (20 mg total) by mouth 2 (two) times daily. 60 tablet 11    [DISCONTINUED] methylPREDNISolone (MEDROL) 4 MG Tab Take 4 mg by mouth once daily.      [DISCONTINUED] spironolactone (ALDACTONE) 100 MG tablet Take 1 tablet (100 mg total) by mouth once daily. 90 tablet 3       Physical Exam    Reviewed nursing notes.  Vitals:    08/07/23 1210 08/07/23 1300 08/07/23 1310 08/07/23 1700   BP: 114/82 119/70  117/69   Pulse: 100 102 102 100   Resp: 14 18 18 16   Temp: 98.2 °F (36.8 °C)      TempSrc: Oral      SpO2: 98%  100% 100%   Weight: 62.9 kg (138 lb 10.7 oz)      Height: 5' 3" (1.6 m)        General:  Alert, no acute distress.  Cachectic.  Skin:  Warm, dry, intact.  No rash.  Jaundice.  Head:  Normocephalic, atraumatic.    Neck:  Supple.   HEENT:  Pupils are equal and round, appropriate for room, extraocular movements are intact.  Normal phonation.  Moist mucous membranes.  Scleral icterus.    Cardiovascular:  Regular rate and rhythm, Normal peripheral perfusion, No edema.    Respiratory:  Lungs are clear to auscultation, respirations are non-labored, breath sounds are equal.    Gastrointestinal:  Soft, tender diffusely, distended with ascites, reducible right " groin ascites  Back:  Nontender.  Musculoskeletal:  Normal range of motion observed.  Neurological:  Alert and oriented to person, place, but does not respond to time or situation.  No focal deficits observed.  She responds to commands and follows commands.  Psychiatric:  Cooperative, flat affect.    Initial MDM and Data Review    67 y.o. female presenting for evaluation of general weakness with likely decompensated cirrhosis that is largely gone untreated to this point.  Recently seen in the emergency department setting for anemia and was discharged after 2 units PRBCs.  From the daughter, it sounds that the patient's cirrhosis etiology is alcohol cirrhosis though she has had hepatitis-C positive in the past according to documentation.  She is confused which is not her normal baseline.  Stopped drinking about 2 months ago.  Afebrile.    Differential includes:  Decompensated cirrhosis, hyperammonemia.  Altered mental status secondary to electrolyte disturbance or metabolic effect.  ICH seems less likely but with recent thrombocytopenia will rule out.  SBP considered given the tenderness on exam with worsening abdominal pain.    Work-up includes:  CBC and CMP, ammonia, lactate (not necessarily for sepsis evaluation but for hypoperfusion to the tissues in some circumstances such as ischemic colitis), coags, CTA abdomen pelvis, chest x-ray, CT head    Interventions include:  Cardiac monitoring, diagnostic paracentesis, likely antibiotics for concern for SBP    The patient has significant medical comorbidities that influence decision making for this acute process, such as:  Cirrhosis    I decided to obtain the patient's medical records and review relevant documentation from hospital records.  Pertinent information is noted.  Seen August 5th hemoglobin 5.8 transfuse 2 units to 7.8, platelets were 51, bilirubin was 6.2, AST 94 ALT 31, urinalysis was concerning for infection, CT abdomen pelvis concerning for cirrhosis and  sequelae of portal hypertension with large volume ascites and hepatosplenomegaly., possible splenic infarct    Admitted to hospital at [facility] in early July, found to be anemic then, noted cirrhosis with chronic HCV was documented.  She was started on Aldactone for ascites, given PRBCs, GI noted recent scopes and no other treatment except for restarting HCV treatment and referral to hepatology due to high MELD score.  Had not yet been seen by GI outpatient.  Possible transplant candidate?     Medications   lactulose 20 gram/30 mL solution Soln 10 g (has no administration in time range)   cefTRIAXone (ROCEPHIN) 1 g in dextrose 5 % in water (D5W) 100 mL IVPB (MB+) (0 g Intravenous Stopped 8/7/23 1619)   iohexoL (OMNIPAQUE 350) injection 75 mL (75 mLs Intravenous Given 8/7/23 1525)       Results and ED Course    Labs Reviewed   CBC W/ AUTO DIFFERENTIAL - Abnormal; Notable for the following components:       Result Value    RBC 3.04 (*)     Hemoglobin 9.7 (*)     Hematocrit 28.9 (*)     MCH 31.9 (*)     RDW 24.5 (*)     Platelets 83 (*)     Immature Granulocytes 0.6 (*)     Lymph # 0.9 (*)     nRBC 1 (*)     Gran % 76.9 (*)     Lymph % 13.9 (*)     All other components within normal limits    Narrative:     Release to patient->Immediate   COMPREHENSIVE METABOLIC PANEL - Abnormal; Notable for the following components:    Sodium 132 (*)     Calcium 8.0 (*)     Albumin 1.3 (*)     Total Bilirubin 8.5 (*)      (*)     eGFR 55.8 (*)     All other components within normal limits    Narrative:     Release to patient->Immediate   LACTIC ACID, PLASMA - Abnormal; Notable for the following components:    Lactate (Lactic Acid) 3.4 (*)     All other components within normal limits    Narrative:     Release to patient->Immediate   PROTIME-INR - Abnormal; Notable for the following components:    Prothrombin Time 23.8 (*)     INR 2.4 (*)     All other components within normal limits    Narrative:     Release to  patient->Immediate   APTT - Abnormal; Notable for the following components:    aPTT 42.7 (*)     All other components within normal limits    Narrative:     Release to patient->Immediate   URINALYSIS, REFLEX TO URINE CULTURE - Abnormal; Notable for the following components:    Specific Gravity, UA >1.030 (*)     Protein, UA 1+ (*)     Bilirubin (UA) 2+ (*)     Occult Blood UA 2+ (*)     Leukocytes, UA Trace (*)     All other components within normal limits    Narrative:     Specimen Source->Urine   AMMONIA - Abnormal; Notable for the following components:    Ammonia 138 (*)     All other components within normal limits    Narrative:     Release to patient->Immediate   URINALYSIS MICROSCOPIC - Abnormal; Notable for the following components:    RBC, UA >100 (*)     WBC, UA 18 (*)     Bacteria Many (*)     Hyaline Casts, UA 61 (*)     All other components within normal limits    Narrative:     Specimen Source->Urine   ISTAT PROCEDURE - Abnormal; Notable for the following components:    POC PO2 27 (*)     POC SATURATED O2 52 (*)     All other components within normal limits   ISTAT PROCEDURE - Abnormal; Notable for the following components:    POC Sodium 135 (*)     POC Ionized Calcium 1.00 (*)     POC Hematocrit 27 (*)     All other components within normal limits   CULTURE, AEROBIC  (SPECIFY SOURCE)   CULTURE, ANAEROBIC   GRAM STAIN   CULTURE, URINE   HIV 1 / 2 ANTIBODY    Narrative:     Release to patient->Immediate   B-TYPE NATRIURETIC PEPTIDE    Narrative:     Release to patient->Immediate   LIPASE    Narrative:     Release to patient->Immediate   MAGNESIUM    Narrative:     Release to patient->Immediate   TSH    Narrative:     Release to patient->Immediate   TROPONIN I    Narrative:     Release to patient->Immediate   GAMMA GT    Narrative:     Release to patient->Immediate   CK    Narrative:     Release to patient->Immediate   ALBUMIN, PERITONEAL, PLEURAL FLUID OR NATHALIE DRAINAGE, IN-HOUSE    Narrative:     To rule  out SBP order labs: Aerobic culture [SFH680],                  Culture, Anaerobic [XKY984], Gram stain [BRO831], Albumin                  [UWT147], Protein [XOB915], LDH [NFW772], WBC \T\ Dff                  [OXS1353].   PROTEIN, PERITONEAL, PLEURAL FLUID OR NATHALIE DRAINAGE, IN-HOUSE    Narrative:     To rule out SBP order labs: Aerobic culture [GMF180],                  Culture, Anaerobic [HNN303], Gram stain [LKW290], Albumin                  [XSJ563], Protein [NXC499], LDH [OUY943], WBC \T\ Dff                  [[id number]].   LDH, PERITONEAL, PLEURAL FLUID OR NATHALIE DRAINAGE, IN-HOUSE    Narrative:     To rule out SBP order labs: Aerobic culture [QTK364],                  Culture, Anaerobic [AHC949], Gram stain [YIU067], Albumin                  [JVX469], Protein [DZG756], LDH [VDE781], WBC \T\ Dff                  [[id number]].   WBC & DIFF, BODY FLUID   TYPE & SCREEN   ISTAT CHEM8       Imaging Results              CTA Abdomen and Pelvis (In process)  Result time 08/07/23 15:01:56                     CT Head Without Contrast (Final result)  Result time 08/07/23 15:49:29      Final result by Tommie Garcia MD (08/07/23 15:49:29)                   Impression:      No acute intracranial pathology.  If concern persists for acute ischemic process, consider MRI brain without contrast for further evaluation.    Electronically signed by resident: Darrell Smith  Date:    08/07/2023  Time:    15:26    Electronically signed by: Tommie Garcia  Date:    08/07/2023  Time:    15:49               Narrative:    EXAMINATION:  CT HEAD WITHOUT CONTRAST    CLINICAL HISTORY:  Mental status change, unknown cause;    TECHNIQUE:  Low dose axial CT images obtained throughout the head without the use of intravenous contrast.  Axial, sagittal and coronal reconstructions were performed.    COMPARISON:  CT head 12/10/2016    FINDINGS:  Intracranial compartment:    Ventricles and sulci are normal in size for age without evidence of  hydrocephalus.    Bilateral basal ganglia calcifications, unchanged from comparison imaging.  Scattered hypodense foci scattered throughout periventricular white matter, which is nonspecific but can be seen with chronic microvascular ischemic disease.  No new parenchymal hemorrhage, edema, mass effect or major vascular territory infarct.    No extra-axial blood or fluid collections.    Skull/extracranial contents (limited evaluation):    No displaced calvarial fracture.    The mastoid air cells and visualized paranasal sinuses are essentially clear.                                       X-Ray Chest 1 View (Final result)  Result time 08/07/23 13:40:07      Final result by Live Cameron MD (08/07/23 13:40:07)                   Impression:      See above      Electronically signed by: Live Cameron MD  Date:    08/07/2023  Time:    13:40               Narrative:    EXAMINATION:  XR CHEST 1 VIEW    CLINICAL HISTORY:  Weakness    TECHNIQUE:  Single frontal view of the chest was performed.    COMPARISON:  03/17/2017    FINDINGS:  Cardiac size is normal.  Aorta is tortuous.  Lungs are clear.                                      ED Course as of 08/07/23 1744   Mon Aug 07, 2023   1321 Hemoglobin(!): 9.7  Improved from last weekend [AC]   1322 Platelets(!): 83  improved [AC]   1331 Ammonia(!): 138  Hyperammonemic, likely due to cirrhosis, will need lactulose after CT most likely [AC]   1414 INR(!): 2.4 [AC]   1414 GGT: 27 [AC]   1414 Lipase: 25 [AC]   1414 Sodium(!): 132 [AC]   1414 Lactate, Miguelito(!): 3.4 [AC]   1414 BILIRUBIN TOTAL(!): 8.5 [AC]   1414 AST(!): 110 [AC]   1432 Completed diagnostic paracentesis.  Labs have been sent.  Added ceftriaxone.  For SBP concerns.  Pending CTs and admission. [AC]   1531 CT Head Without Contrast  No obvious ICH [AC]   1717 Urinalysis Microscopic(!)  Poor sample  but getting ceftriaxone [AC]      ED Course User Index  [AC] Eric Boyd, DO     Paracentesis    Date/Time:  8/7/2023 2:11 PM  Location procedure was performed: [facility] EMERGENCY DEPARTMENT    Performed by: Eric Boyd DO  Authorized by: Eric Boyd DO  Consent Done: Yes  Consent: Written consent obtained.  Consent given by: power of  (adult daughter)  Initial or subsequent exam: initial  Procedure purpose: diagnostic  Indications: abdominal discomfort secondary to ascites and new onset ascites  Needle gauge: 20  Ultrasound guidance: yes  Puncture site: right lower quadrant  Fluid appearance: cloudy  Dressing: 4x4 sterile gauze and pressure dressing  Patient tolerance: Patient tolerated the procedure well with no immediate complications  Comments: Ascites drained, cloudy yellow in appearance.  About 30 cc obtained and sent to lab for analysis.             EKG interpreted by myself    EKG  Performed: 1556  Rate/Rhythm/Axis: 99 bpm, sinus rhythm, nml axis  QRS 86 ms  Qtc 526 ms  Impression:  Normal sinus rhythm, QT prolongation noted, no significant ischemic changes      Relevant imaging interpreted by myself  No consolidation  No ICH    Impression and Plan    67 y.o. female with findings of decompensated cirrhosis with hyperammonemia and concern for SBP based on the work up in the emergency department as above.    Important lab/imaging findings include:  As above    I consulted with: radiology re CTA  ?chronic splenic infarct but new to patient  No ischemic colitis    All tests, treatment options and disposition options were discussed with the patient.  The decision was made to admit the patient to the hospital.    The patient was admitted in stable condition and all further questions/concerns by patient and/or family were addressed.    Lactulose ordered.    Critical Care:  Date: 08/07/2023  Performed by: Dr. Eric Boyd  Authorized by: Dr. Eric Boyd   Total critical care time (exclusive of procedural time) : 45 minutes  Upon my evaluation, this patient had a high probability of imminent or  life-threatening deterioration due to [ SBP, AMS, decompensated cirrhosis ] which required my direct attention, intervention and personal management.  Critical care was necessary to treat or prevent imminent or life-threatening deterioration.  This may include review of laboratory data, radiology results, discussion with consultants and family, and monitoring for potential decompensations. Interventions were performed as documented.         Final diagnoses:  [R53.1] Weakness  [K70.31] Ascites due to alcoholic cirrhosis (Primary)  [K74.60] Hepatic cirrhosis, unspecified hepatic cirrhosis type, unspecified whether ascites present  [E72.20] Hyperammonemia  [K65.2] Spontaneous bacterial peritonitis        ED Disposition Condition    Admit Eric De Oliveira, DO  08/07/23 2061

## 2023-08-07 NOTE — ED NOTES
Pt BIBA from home. She was seen at [facility] recently for complications from liver cirrhosis. Today, her daughter reports patient is more confused, not eating or drinking and continues to report abd pain.

## 2023-08-07 NOTE — PHARMACY MED REC
"Admission Medication History     The home medication history was taken by Shanika Napoles.    You may go to "Admission" then "Reconcile Home Medications" tabs to review and/or act upon these items.     The home medication list has been updated by the Pharmacy department.   Please read ALL comments highlighted in yellow.   Please address this information as you see fit.    Feel free to contact us if you have any questions or require assistance.      The medications listed below were removed from the home medication list. Please reorder if appropriate:  Patient reports no longer taking the following medication(s):  ALENDRONATE 70 MG  ERGOCALCIFEROL 50,000 UNIT   FUROSEMIDE 20 MG  METHYLPREDNISOLONE 4 MG TAB  SPIRONOLACTONE 100 MG    Medications listed below were obtained from: Kylee - daughter  Current Outpatient Medications on File Prior to Encounter   Medication Sig    bismuth subsalicylate (PEPTO-BISMOL) 262 mg/15 mL suspension   Take 30 mLs by mouth daily as needed for Indigestion.    CYANOCOBALAMIN, VITAMIN B-12, ORAL   Take 1,000 mcg by mouth once daily.    DULCOLAX, BISACODYL, ORAL   Take 1 tablet by mouth daily as needed (Constipation).    loratadine (CLARITIN) 10 mg tablet   Take 10 mg by mouth daily as needed for Allergies.    ondansetron (ZOFRAN) 4 MG tablet   Take 4 mg by mouth every 8 (eight) hours as needed.    polyethylene glycol 400 (VISINE DRY EYE RELIEF OPHT)   Place 1 drop into both eyes daily as needed (Dry eye).               Shanika Napoles  EXT [id number]                  .          "

## 2023-08-07 NOTE — ED NOTES
I-STAT Chem-8+ Results:   Value Reference Range   Sodium 135 136-145 mmol/L   Potassium  3.8 3.5-5.1 mmol/L   Chloride 96  mmol/L   Ionized Calcium 1.00 1.06-1.42 mmol/L   CO2 (measured) 24 23-29 mmol/L   Glucose 96  mg/dL   BUN 21 6-30 mg/dL   Creatinine 1.2 0.5-1.4 mg/dL   Hematocrit 27 36-54%

## 2023-08-08 PROBLEM — Z71.89 ACP (ADVANCE CARE PLANNING): Status: ACTIVE | Noted: 2023-01-01

## 2023-08-08 PROBLEM — E43 SEVERE MALNUTRITION: Status: ACTIVE | Noted: 2023-01-01

## 2023-08-08 PROBLEM — Z51.5 COMFORT MEASURES ONLY STATUS: Status: ACTIVE | Noted: 2023-01-01

## 2023-08-08 NOTE — CONSULTS
[facility] - Telemetry Stepdown  Adult Nutrition  Consult Note    SUMMARY     Recommendations    Diet advancement per SLP. Rec'd Low Na diet + Boost Breeze ONS.  If unable to advance diet, place NGT & initiate TFs. Rec'd Isosource 1.5 @ 45 mL/hr = 1620 kcals, 73 g of protein, 825 mL fluid.  - Please advance rate slowly & monitor for re-feeding syndrome.    RD to monitor & follow-up.    Goals: Meet % EEN, EPN by RD f/u date  Nutrition Goal Status: new  Communication of RD Recs: other (comment) (POC)    Assessment and Plan    Severe malnutrition    Nutrition Problem:  Severe Protein-Calorie Malnutrition  Malnutrition in the context of Social/Environmental Circumstances    Related to (etiology):  Inability to consume sufficient energy     Signs and Symptoms (as evidenced by):  Energy Intake: <50% of estimated energy requirement for > 1 month   Body Fat Depletion: severe depletion of orbitals, triceps and thoracic and lumbar region   Muscle Mass Depletion: severe depletion of temples, clavicle region, scapular region, interosseous muscle and lower extremities   Weight Loss: 16% x 2-3 months    Interventions(treatment strategy):  Collaboration of nutrition care w/ other providers  ADAT/ONS vs EN     Nutrition Diagnosis Status:  New    Malnutrition Assessment    Malnutrition Context: social/environmental circumstances  Malnutrition Level: severe    Weight Loss (Malnutrition): greater than 7.5% in 3 months  Energy Intake (Malnutrition): less than or equal to 50% for greater than or equal to 1 month  Subcutaneous Fat (Malnutrition): severe depletion  Muscle Mass (Malnutrition): severe depletion   Orbital Region (Subcutaneous Fat Loss): severe depletion  Upper Arm Region (Subcutaneous Fat Loss): severe depletion   Clarion Region (Muscle Loss): severe depletion  Clavicle Bone Region (Muscle Loss): severe depletion  Dorsal Hand (Muscle Loss): severe depletion  Patellar Region (Muscle Loss): severe depletion  Anterior  "Thigh Region (Muscle Loss): severe depletion     Reason for Assessment    Reason For Assessment: consult  Diagnosis: other (see comments) (Encephalopathy)  Relevant Medical History: Etoh abuse, Cirrhosis, HCV  Interdisciplinary Rounds: did not attend    General Information Comments: NPO; disoriented - spoke w/ family member at bedside. Reports pt w/ decreased appetite PTA x 1-2 months & UBW of 130# (chart review confirms). NFPE was complete w/ severe wasting found. Pt meets criteria for severe malnutrition. Please see PES statement for details. If diet advanced, family member thinks pt would like ONS.  Nutrition Discharge Planning: Pending clinical course    Nutrition/Diet History    Factors Affecting Nutritional Intake: NPO    Anthropometrics    Temp: 97 °F (36.1 °C)  Height Method: Stated  Height: 5' 3" (160 cm)  Height (inches): 63 in  Weight Method: Bed Scale  Weight: 49.7 kg (109 lb 9.1 oz)  Weight (lb): 109.57 lb  Ideal Body Weight (IBW), Female: 115 lb  % Ideal Body Weight, Female (lb): 95.28 %  BMI (Calculated): 19.4  BMI Grade: 18.5-24.9 - normal  Usual Body Weight (UBW), k kg  % Usual Body Weight: 84.41  % Weight Change From Usual Weight: -15.76 %    Lab/Procedures/Meds    Pertinent Labs Reviewed: reviewed  Pertinent Labs Comments: Na 132, Bili 8.5, GFR 55.8  Pertinent Medications Reviewed: reviewed  Pertinent Medications Comments: Lactulose    Estimated/Assessed Needs    Weight Used For Calorie Calculations: 49.7 kg (109 lb 9.1 oz)    Energy Calorie Requirements (kcal): 9045-7355 kcal/d  Energy Need Method: Kcal/kg (30-35 kcal/kg)    Protein Requirements: 75 g/d (1.5 g/kg)  Weight Used For Protein Calculations: 49.7 kg (109 lb 9.1 oz)    Estimated Fluid Requirement Method: other (see comments) (Per MD or 1 mL/kcal)  RDA Method (mL): 1491    Nutrition Prescription Ordered    Current Diet Order: NPO    Evaluation of Received Nutrient/Fluid Intake    I/O: +1L since admit    Comments: LBM: " 8/8    Nutrition Risk    Level of Risk/Frequency of Follow-up:  (1x/week)     Monitor and Evaluation    Food and Nutrient Intake: enteral nutrition intake, food and beverage intake, energy intake  Food and Nutrient Adminstration: enteral and parenteral nutrition administration, diet order  Physical Activity and Function: nutrition-related ADLs and IADLs  Anthropometric Measurements: weight, weight change  Biochemical Data, Medical Tests and Procedures: glucose/endocrine profile, lipid profile, inflammatory profile, gastrointestinal profile, electrolyte and renal panel  Nutrition-Focused Physical Findings: overall appearance     Nutrition Follow-Up    RD Follow-up?: Yes

## 2023-08-08 NOTE — HPI
"68 y/o F with PMH of HCV and alcoholic cirrhosis, chronic anemia requiring transfusions, debility was admitted for worsening confusion and abd distention. Pt most recently seen in [facility] ED on 8/5/2023 for weakness and abd distention. Pt found to have hgb of 5, received 2u pRBC and d/c home after no evidence of active bleeding was identified. Since that time pt has had worsnening of her sx. Per pt daughter, pt has had significantly decreased PO intake associated with her confusion. Per daughter, pt has is still drinking approximately 12 beers daily and some liquor. In the ED pt had paracentesis, lactulose enema and rectal tube placed with dark stool noted. Pt has been HDS but slightly tachycardic in since admission, Spo2 >97% on RA.     Hepatology consulted for "grade 4 HE with decompensated cirrhosis (HCV/ETOH)". Pt severely altered and unable to answer questions. Per record review and daughter, pt has had increasing confusion for past few days. No previous hx of AMS. Does not take home lactulose. Ammonia 138 on admission. Pt has also had worsening abd distention and pain. During recent admission in early July, pt d/c on spironolactone 100mg qd and lasix 20mg qd. Unsure if she has been taking these medications. CTAP this admission demonstrates cirrhotic liver and sequela of portal htn, large volume ascites. Diagnostic paracentesis done yesterday, no evidence of SBP. Pt had EGD 4/2023, with no evidence of varices. Pt started on rocephin in ED. Pertinent results include AST//34, ALP 70, Tbili 8.5, Albumin 1.3, PT/INR 23.8/2.4, lactate uptrending to 10.3. Stool in fecal management system appears melanic. Per record review, pt has hx of untreated chronic HCV first noted in 2015, most recently positive in 2/2023. MELD of 31.  "

## 2023-08-08 NOTE — ASSESSMENT & PLAN NOTE
-Grade 4 hepatic encephalopathy, GCS 6-7  -nursing/ED Md report patient was speaking on arrival but on my exam pt moaning and is not responsive to sternal rub  -can continue lactulose enemas  -NPO strict - not safe to take any oral meds.  Discuss with family re: potential NG tube placement.   -hepatology Consult in AM   -s/p CTA abdomen and diagnostic paracentesis, Given dose of Ceftriaxone.   -f/u pending ascitic WBC count.

## 2023-08-08 NOTE — ASSESSMENT & PLAN NOTE
-patient was not taking any Rx meds prior to admission  -If patient recovers from severe encephalopathic state and renal function stable - restart on Lasix and Spironolactine at Lasix 40mg daily + Spironolactone 100mg daily

## 2023-08-08 NOTE — PLAN OF CARE
Recommendations     Diet advancement per SLP. Rec'd Low Na diet + Boost Breeze ONS.  If unable to advance diet, place NGT & initiate TFs. Rec'd Isosource 1.5 @ 45 mL/hr = 1620 kcals, 73 g of protein, 825 mL fluid.  - Please advance rate slowly & monitor for re-feeding syndrome.    RD to monitor & follow-up.     Goals: Meet % EEN, EPN by RD f/u date  Nutrition Goal Status: new  Communication of RD Recs: other (comment) (POC)

## 2023-08-08 NOTE — ASSESSMENT & PLAN NOTE
Sending anemia studes - given 2 units PRBC earlier this week and hgb is stable at this time. H&H is 7.8/24    Plan:  - transfuse if Hg < 7.0

## 2023-08-08 NOTE — SUBJECTIVE & OBJECTIVE
Review of Systems   Reason unable to perform ROS: altered mental status.       Past Medical History:   Diagnosis Date    Age-related osteoporosis without current pathological fracture 3/7/2023    Alcohol dependence 2023    Anemia     Chronic hepatitis C without hepatic coma 7/3/2023    Decompensated cirrhosis related to hepatitis C virus (HCV) 2023    Elevated liver enzymes     Hepatitis     hep C ab +    History of blood transfusion     in 80s    Other ascites 2023    Seasonal allergies     Symptomatic anemia 7/3/2023       Past Surgical History:   Procedure Laterality Date     SECTION      COLONOSCOPY N/A 2023    Procedure: COLONOSCOPY;  Surgeon: Analisa Bhandari MD;  Location: [facility];  Service: Endoscopy;  Laterality: N/A;    ESOPHAGOGASTRODUODENOSCOPY N/A 2023    Procedure: EGD (ESOPHAGOGASTRODUODENOSCOPY);  Surgeon: Analisa Bhandari MD;  Location: [facility];  Service: Endoscopy;  Laterality: N/A;    HYSTERECTOMY             Review of patient's allergies indicates:   Allergen Reactions    Codeine Nausea And Vomiting         Tobacco Use    Smoking status: Never    Smokeless tobacco: Never   Substance and Sexual Activity    Alcohol use: Yes     Comment: 2 times per month 4 beers    Drug use: No    Sexual activity: Not on file       Medications Prior to Admission   Medication Sig Dispense Refill Last Dose    bismuth subsalicylate (PEPTO-BISMOL) 262 mg/15 mL suspension Take 30 mLs by mouth daily as needed for Indigestion.       CYANOCOBALAMIN, VITAMIN B-12, ORAL Take 1,000 mcg by mouth once daily.       DULCOLAX, BISACODYL, ORAL Take 1 tablet by mouth daily as needed (Constipation).       loratadine (CLARITIN) 10 mg tablet Take 10 mg by mouth daily as needed for Allergies.       ondansetron (ZOFRAN) 4 MG tablet Take 4 mg by mouth every 8 (eight) hours as needed.       polyethylene glycol 400 (VISINE DRY EYE RELIEF OPHT) Place 1 drop into both eyes daily as needed (Dry eye).           Objective:     Vital Signs (Most Recent):  Temp: 97 °F (36.1 °C) (08/08/23 0732)  Pulse: 104 (08/08/23 0732)  Resp: 15 (08/08/23 0732)  BP: 123/80 (08/08/23 0732)  SpO2: 97 % (08/08/23 0507) Vital Signs (24h Range):  Temp:  [96.4 °F (35.8 °C)-98.2 °F (36.8 °C)] 97 °F (36.1 °C)  Pulse:  [] 104  Resp:  [14-23] 15  SpO2:  [97 %-100 %] 97 %  BP: (112-138)/(66-82) 123/80     Weight: 49.7 kg (109 lb 9.1 oz) (08/08/23 0400)  Body mass index is 19.41 kg/m².       Physical Exam  Vitals reviewed.   Constitutional:       Appearance: She is ill-appearing.   HENT:      Head: Normocephalic and atraumatic.      Mouth/Throat:      Mouth: Mucous membranes are dry.   Eyes:      General: Scleral icterus present.      Pupils: Pupils are equal, round, and reactive to light.   Cardiovascular:      Rate and Rhythm: Regular rhythm. Tachycardia present.   Pulmonary:      Breath sounds: Normal breath sounds.   Abdominal:      General: There is distension.      Tenderness: There is abdominal tenderness.   Musculoskeletal:         General: No signs of injury.      Right lower leg: No edema.      Left lower leg: No edema.   Skin:     Coloration: Skin is jaundiced.   Neurological:      Mental Status: She is lethargic and confused.      GCS: GCS eye subscore is 4. GCS verbal subscore is 2. GCS motor subscore is 5.            MELD 3.0: 31 at 8/7/2023 12:52 PM  MELD-Na: 27 at 8/7/2023 12:52 PM  Calculated from:  Serum Creatinine: 1.1 mg/dL at 8/7/2023 12:52 PM  Serum Sodium: 132 mmol/L at 8/7/2023 12:52 PM  Total Bilirubin: 8.5 mg/dL at 8/7/2023 12:52 PM  Serum Albumin: 1.3 g/dL (Using min of 1.5 g/dL) at 8/7/2023 12:52 PM  INR(ratio): 2.4 at 8/7/2023 12:52 PM  Age at listing (hypothetical): 67 years  Sex: Female at 8/7/2023 12:52 PM      Significant Labs:  CBC:   Recent Labs   Lab 08/07/23  1252 08/07/23  1310   WBC 6.31  --    RBC 3.04*  --    HGB 9.7*  --    HCT 28.9* 27*   PLT 83*  --      CMP:   Recent Labs   Lab 08/07/23  1252    GLU 95   CALCIUM 8.0*   ALBUMIN 1.3*   PROT 7.6   *   K 3.7   CO2 23   CL 99   BUN 17   CREATININE 1.1   ALKPHOS 70   ALT 34   *   BILITOT 8.5*     Coagulation:   Recent Labs   Lab 08/07/23  1252   INR 2.4*   APTT 42.7*       Significant Imaging:  CT: Reviewed

## 2023-08-08 NOTE — CONSULTS
Patient seen and accepted. Has Grade IV encephalopathy, on lactulose via retention enema, currently has Hippus + uptrending lactate now 10. MAP 70s, GCS 6-7. On BS abx, SBP (-) from yesterday para. Transferring for higher level of care. EGD from 4/2023 no esophageal varices.     Plan:  Drop NGT  Confirm CODE status with   Increase frequency of lactulose and change route to NGT  Isotonic crystalloid 1L over 6h, repeat LA q 3 h  Continue broad spec abx    Keron Doll, DO  PGY-VI, [employer] PCCM Fellow

## 2023-08-08 NOTE — ACP (ADVANCE CARE PLANNING)
Advance Care Planning     Date: 08/08/2023    Today a voluntary meeting took place: bedside    Patient Participation: Patient is unable to participate     Attendees (Name and  Relationship to patient):  Daughter Nitza Saeed    Staff attendees (Name and  Role): Kole Mahmood MD admitting physician      ACP Conversation (General): Understanding of current condition Described that patient has grade 4 encephalopathy,   Other (specify below)      Code Status: Full Code     ACP Documents: None    Goals of care: The  Daughter  endorses that what is most important right now is to focus on  Treatment of current decompensated state and if patient's condition does not improve and if she is not a transplant candidate, she is thinking most likely would like to shift towards comfort care measures and enrollment in home hospice services in order to keep her mother comfortable.     Accordingly, we have decided that the best plan to meet the patient's goals includes continuing with treatment      Recommendations/  Follow-up tasks: Other (specify below) F/u pending hepatology consultation and attempts at treatment for hepatic encephalopathy.        Length of ACP   conversation in minutes: 26 minutes

## 2023-08-08 NOTE — AI DETERIORATION ALERT
Artificial Intelligence Notification  [facility]  [address]  [place] [address]  Phone: [phone]    This documentation was triggered by an Artificial Intelligence Notification:    Admit Date: 2023   LOS: 1  Code Status: Full Code  : 1955  Age: 67 y.o.  Weight:   Wt Readings from Last 1 Encounters:   23 49.7 kg (109 lb 9.1 oz)        Sex: female  Bed: 8083/8083 A  MRN: [id number]  Attending Physician: Moe Carmona*     Date of Alert: 2023  Time AI Alert Received: 1049            Vitals:    23 0732   BP: 123/80   Pulse: 104   Resp: 15   Temp: 97 °F (36.1 °C)     SpO2: 97 %      Artificial Intelligence alert discussed with Provider:     Name: Susan Ambrosio, bedside RN, MAKENNA Lopez   Date/Time of Provider Notification: 1050      Patient Condition: Patient continues to be altered with a resulted resulted Lactic of 10.3. Critical Care Consult placed and MICU team will take over care. Pt to be transferred to MICU.

## 2023-08-08 NOTE — SUBJECTIVE & OBJECTIVE
Past Medical History:   Diagnosis Date    Age-related osteoporosis without current pathological fracture 3/7/2023    Alcohol dependence 2023    Anemia     Chronic hepatitis C without hepatic coma 7/3/2023    Decompensated cirrhosis related to hepatitis C virus (HCV) 2023    Elevated liver enzymes     Hepatitis     hep C ab +    History of blood transfusion     in 80s    Other ascites 2023    Seasonal allergies     Symptomatic anemia 7/3/2023       Past Surgical History:   Procedure Laterality Date     SECTION      COLONOSCOPY N/A 2023    Procedure: COLONOSCOPY;  Surgeon: Analisa Bhandari MD;  Location: [facility];  Service: Endoscopy;  Laterality: N/A;    ESOPHAGOGASTRODUODENOSCOPY N/A 2023    Procedure: EGD (ESOPHAGOGASTRODUODENOSCOPY);  Surgeon: Analisa Bhandari MD;  Location: [facility];  Service: Endoscopy;  Laterality: N/A;    HYSTERECTOMY         Review of patient's allergies indicates:   Allergen Reactions    Codeine Nausea And Vomiting       No current facility-administered medications on file prior to encounter.     Current Outpatient Medications on File Prior to Encounter   Medication Sig    bismuth subsalicylate (PEPTO-BISMOL) 262 mg/15 mL suspension Take 30 mLs by mouth daily as needed for Indigestion.    CYANOCOBALAMIN, VITAMIN B-12, ORAL Take 1,000 mcg by mouth once daily.    DULCOLAX, BISACODYL, ORAL Take 1 tablet by mouth daily as needed (Constipation).    loratadine (CLARITIN) 10 mg tablet Take 10 mg by mouth daily as needed for Allergies.    ondansetron (ZOFRAN) 4 MG tablet Take 4 mg by mouth every 8 (eight) hours as needed.    polyethylene glycol 400 (VISINE DRY EYE RELIEF OPHT) Place 1 drop into both eyes daily as needed (Dry eye).     Family History       Problem Relation (Age of Onset)    Diabetes Mother, Maternal Grandmother, Daughter    Lung cancer Father          Tobacco Use    Smoking status: Never    Smokeless tobacco: Never   Substance and Sexual Activity     Alcohol use: Yes     Comment: 2 times per month 4 beers    Drug use: No    Sexual activity: Not on file     Review of Systems   Unable to perform ROS: Mental status change     Objective:     Vital Signs (Most Recent):  Temp: 97.5 °F (36.4 °C) (08/08/23 0008)  Pulse: 108 (08/08/23 0008)  Resp: 20 (08/08/23 0008)  BP: 128/73 (08/08/23 0008)  SpO2: 100 % (08/08/23 0008) Vital Signs (24h Range):  Temp:  [96.4 °F (35.8 °C)-98.2 °F (36.8 °C)] 97.5 °F (36.4 °C)  Pulse:  [] 108  Resp:  [14-23] 20  SpO2:  [97 %-100 %] 100 %  BP: (112-138)/(66-82) 128/73     Weight: 62.9 kg (138 lb 10.7 oz)  Body mass index is 24.56 kg/m².     Physical Exam  Vitals reviewed.   Constitutional:       Appearance: She is cachectic. She is ill-appearing and toxic-appearing.   Eyes:      General: Scleral icterus present.      Pupils: Pupils are equal, round, and reactive to light.   Cardiovascular:      Rate and Rhythm: Regular rhythm. Tachycardia present.   Pulmonary:      Effort: Pulmonary effort is normal.      Breath sounds: No wheezing or rales.   Abdominal:      General: Bowel sounds are normal. There is distension.      Palpations: Abdomen is soft. There is fluid wave.      Tenderness: There is no abdominal tenderness.      Comments: ascites   Musculoskeletal:      Right lower leg: Edema present.      Left lower leg: Edema present.   Skin:     General: Skin is warm and dry.   Neurological:      Mental Status: She is unresponsive.      GCS: GCS eye subscore is 4. GCS verbal subscore is 1. GCS motor subscore is 2.              CRANIAL NERVES     CN III, IV, VI   Pupils are equal, round, and reactive to light.       Significant Labs: All pertinent labs within the past 24 hours have been reviewed.  CBC:   Recent Labs   Lab 08/07/23  1252 08/07/23  1310   WBC 6.31  --    HGB 9.7*  --    HCT 28.9* 27*   PLT 83*  --      CMP:   Recent Labs   Lab 08/07/23  1252   *   K 3.7   CL 99   CO2 23   GLU 95   BUN 17   CREATININE 1.1   CALCIUM  8.0*   PROT 7.6   ALBUMIN 1.3*   BILITOT 8.5*   ALKPHOS 70   *   ALT 34   ANIONGAP 10     Cardiac Markers:   Recent Labs   Lab 08/07/23  1252   BNP 70     Coagulation:   Recent Labs   Lab 08/07/23  1252   INR 2.4*   APTT 42.7*     Lactic Acid:   Recent Labs   Lab 08/07/23  1252 08/07/23  1921   LACTATE 3.4* 6.0*     Magnesium:   Recent Labs   Lab 08/07/23  1252   MG 2.1     Urine Studies:   Recent Labs   Lab 08/07/23  1547   COLORU Yellow   APPEARANCEUA Ex.Turbid   PHUR 6.0   SPECGRAV >1.030*   PROTEINUA 1+*   GLUCUA Negative   KETONESU Negative   BILIRUBINUA 2+*   OCCULTUA 2+*   NITRITE Negative   LEUKOCYTESUR Trace*   RBCUA >100*   WBCUA 18*   BACTERIA Many*   SQUAMEPITHEL 36   HYALINECASTS 61*       Significant Imaging: I have reviewed all pertinent imaging results/findings within the past 24 hours.    CT HEAD WITHOUT CONTRAST     CLINICAL HISTORY:  Mental status change, unknown cause;     TECHNIQUE:  Low dose axial CT images obtained throughout the head without the use of intravenous contrast.  Axial, sagittal and coronal reconstructions were performed.     COMPARISON:  CT head 12/10/2016     FINDINGS:  Intracranial compartment:     Ventricles and sulci are normal in size for age without evidence of hydrocephalus.     Bilateral basal ganglia calcifications, unchanged from comparison imaging.  Scattered hypodense foci scattered throughout periventricular white matter, which is nonspecific but can be seen with chronic microvascular ischemic disease.  No new parenchymal hemorrhage, edema, mass effect or major vascular territory infarct.     No extra-axial blood or fluid collections.     Skull/extracranial contents (limited evaluation):     No displaced calvarial fracture.     The mastoid air cells and visualized paranasal sinuses are essentially clear.     Impression:     No acute intracranial pathology.  If concern persists for acute ischemic process, consider MRI brain without contrast for further  evaluation.     Electronically signed by resident: Darrell Smith  Date:                                            08/07/2023  Time:                                           15:26     Electronically signed by: Tommie Garcia  Date:                                            08/07/2023  Time:                                           15:49    XR CHEST 1 VIEW     CLINICAL HISTORY:  Weakness     TECHNIQUE:  Single frontal view of the chest was performed.     COMPARISON:  03/17/2017     FINDINGS:  Cardiac size is normal.  Aorta is tortuous.  Lungs are clear.     Impression:     See above        Electronically signed by: Live Cameron MD  Date:                                            08/07/2023  Time:                                           13:40

## 2023-08-08 NOTE — NURSING
Pt report given to receiving RN; Pt plan of care discussed; Pt VSS; Pt has no C/O pain or discomfort at this time; Bed locked + lowered, Srx3, + call bell within reach.

## 2023-08-08 NOTE — CONSULTS
Pharmacokinetic Initial Assessment: IV Vancomycin    Assessment/Plan:    Initiate intravenous vancomycin with loading dose of 1000 (20mg/kg( mg once with subsequent doses when random concentrations are less than 20 mcg/mL  Patient with VERNON, SCr 1.6 from baseline of 0.8  Desired empiric serum trough concentration is 15 to 20 mcg/mL  Draw vancomycin random level on 8/9 at 0400 with AM labs.  Pharmacy will continue to follow and monitor vancomycin.      Thank you for the consult,   Carrie Peralta, PharmD, [facility]  [id number]     Patient brief summary:  Syl Basilio is a 67 y.o. female initiated on antimicrobial therapy with IV Vancomycin for treatment of suspected sepsis      Actual Body Weight:   50kg    Renal Function:   Estimated Creatinine Clearance: 27.5 mL/min (A) (based on SCr of 1.6 mg/dL (H)).,       CBC (last 72 hours):  Recent Labs   Lab Result Units 08/05/23  1713 08/05/23  2347 08/07/23  1252 08/08/23  0928   WBC K/uL 6.09 4.57 6.31 7.98   Hemoglobin g/dL 5.8* 7.8* 9.7* 7.8*   Hematocrit % 18.6* 23.2* 28.9* 24.3*   Platelets K/uL 77* 51* 83* 57*   Gran % % 51.5 51.2 76.9*  --    Lymph % % 36.1 32.6 13.9*  --    Mono % % 9.4 12.0 7.1  --    Eosinophil % % 1.6 2.8 1.0  --    Basophil % % 0.7 0.7 0.5  --    Differential Method  Automated Automated Automated  --        Metabolic Panel (last 72 hours):  Recent Labs   Lab Result Units 08/05/23  1713 08/05/23  1747 08/07/23  1252 08/07/23  1547 08/08/23  0928   Sodium mmol/L 132*  --  132*  --  134*   Potassium mmol/L 3.3*  --  3.7  --  3.8   Chloride mmol/L 99  --  99  --  100   CO2 mmol/L 23 --  23  --  15*   Glucose mg/dL 85  --  95  --  119*   Glucose, UA   --  Negative  --  Negative  --    BUN mg/dL 16  --  17  --  22   Creatinine mg/dL 0.8  --  1.1  --  1.6*   Albumin g/dL 1.3*  --  1.3*  --  2.2*   Total Bilirubin mg/dL 6.2*  --  8.5*  --  8.3*   Alkaline Phosphatase U/L 67  --  70  --  56   AST U/L 94*  --  110*  --  81*   ALT U/L 31  --  34  --  28  "  Magnesium mg/dL  --   --  2.1  --  2.3   Phosphorus mg/dL  --   --   --   --  4.7*       Drug levels (last 3 results):  No results for input(s): "VANCOMYCINRA", "VANCORANDOM", "VANCOMYCINPE", "VANCOPEAK", "VANCOMYCINTR", "VANCOTROUGH" in the last 72 hours.    Microbiologic Results:  Microbiology Results (last 7 days)       Procedure Component Value Units Date/Time    (rule out SBP) Culture, Anaerobic [[id number]] Collected: 08/07/23 1429    Order Status: Completed Specimen: Ascites from Peritoneal Fluid Updated: 08/08/23 0906     Anaerobic Culture Culture in progress    Narrative:      To rule out SBP order labs: Aerobic culture [[id number]],  Culture, Anaerobic [MAN849], Gram stain [CXZ308], Albumin  [PBH584], Protein [USW266], LDH [TRU429], WBC \T\ Dff  [EUK5530].    (rule out SBP) Aerobic culture [[id number]] Collected: 08/07/23 1429    Order Status: Completed Specimen: Ascites from Peritoneal Fluid Updated: 08/08/23 0902     Aerobic Bacterial Culture No growth    Narrative:      To rule out SBP order labs: Aerobic culture [[id number]],  Culture, Anaerobic [YAQ589], Gram stain [ACF880], Albumin  [JAH068], Protein [IFB454], LDH [HCQ338], WBC \T\ Dff  [XLN8759].    Blood culture [[id number]] Collected: 08/07/23 1916    Order Status: Completed Specimen: Blood from Peripheral, Lower Arm, Left Updated: 08/08/23 0315     Blood Culture, Routine No Growth to date    Narrative:      Left Peripheral    Blood culture [[id number]] Collected: 08/07/23 1916    Order Status: Completed Specimen: Blood from Peripheral, Antecubital, Left Updated: 08/08/23 0315     Blood Culture, Routine No Growth to date    Narrative:      Right Peripheral    (rule out SBP) Gram stain [[id number]] Collected: 08/07/23 1429    Order Status: Completed Specimen: Ascites from Peritoneal Fluid Updated: 08/07/23 1840     Gram Stain Result Rare WBC's      No organisms seen    Narrative:      To rule out SBP order labs: Aerobic culture [JOY916],  Culture, Anaerobic " [ANZ157], Gram stain [CXO121], Albumin  [PAM352], Protein [TZN173], LDH [VKX390], WBC \T\ Dff  [EWJ7954].    Urine culture [[id number]] Collected: 08/07/23 1547    Order Status: No result Specimen: Urine Updated: 08/07/23 1628    Aerobic culture [[id number]] Collected: 08/07/23 1429    Order Status: Canceled Specimen: Ascites from Peritoneal Fluid     Culture, Anaerobic [[id number]] Collected: 08/07/23 1429    Order Status: Canceled Specimen: Ascites from Peritoneal Fluid     Gram stain [[id number]] Collected: 08/07/23 1429    Order Status: Canceled Specimen: Ascites from Peritoneal Fluid

## 2023-08-08 NOTE — SUBJECTIVE & OBJECTIVE
Review of Systems   Reason unable to perform ROS: altered mental status.       Past Medical History:   Diagnosis Date    Age-related osteoporosis without current pathological fracture 3/7/2023    Alcohol dependence 2023    Anemia     Chronic hepatitis C without hepatic coma 7/3/2023    Decompensated cirrhosis related to hepatitis C virus (HCV) 2023    Elevated liver enzymes     Hepatitis     hep C ab +    History of blood transfusion     in 80s    Other ascites 2023    Seasonal allergies     Symptomatic anemia 7/3/2023       Past Surgical History:   Procedure Laterality Date     SECTION      COLONOSCOPY N/A 2023    Procedure: COLONOSCOPY;  Surgeon: Analisa Bhandari MD;  Location: [facility];  Service: Endoscopy;  Laterality: N/A;    ESOPHAGOGASTRODUODENOSCOPY N/A 2023    Procedure: EGD (ESOPHAGOGASTRODUODENOSCOPY);  Surgeon: Analisa Bhandari MD;  Location: [facility];  Service: Endoscopy;  Laterality: N/A;    HYSTERECTOMY             Review of patient's allergies indicates:   Allergen Reactions    Codeine Nausea And Vomiting         Tobacco Use    Smoking status: Never    Smokeless tobacco: Never   Substance and Sexual Activity    Alcohol use: Yes     Comment: 2 times per month 4 beers    Drug use: No    Sexual activity: Not on file       Medications Prior to Admission   Medication Sig Dispense Refill Last Dose    bismuth subsalicylate (PEPTO-BISMOL) 262 mg/15 mL suspension Take 30 mLs by mouth daily as needed for Indigestion.       CYANOCOBALAMIN, VITAMIN B-12, ORAL Take 1,000 mcg by mouth once daily.       DULCOLAX, BISACODYL, ORAL Take 1 tablet by mouth daily as needed (Constipation).       loratadine (CLARITIN) 10 mg tablet Take 10 mg by mouth daily as needed for Allergies.       ondansetron (ZOFRAN) 4 MG tablet Take 4 mg by mouth every 8 (eight) hours as needed.       polyethylene glycol 400 (VISINE DRY EYE RELIEF OPHT) Place 1 drop into both eyes daily as needed (Dry eye).           Objective:     Vital Signs (Most Recent):  Temp: 98.3 °F (36.8 °C) (08/08/23 1145)  Pulse: 101 (08/08/23 1400)  Resp: 18 (08/08/23 1400)  BP: 127/77 (08/08/23 1400)  SpO2: 100 % (08/08/23 1400) Vital Signs (24h Range):  Temp:  [96.4 °F (35.8 °C)-98.3 °F (36.8 °C)] 98.3 °F (36.8 °C)  Pulse:  [] 101  Resp:  [15-28] 18  SpO2:  [97 %-100 %] 100 %  BP: (112-140)/(34-83) 127/77     Weight: 49.7 kg (109 lb 9.1 oz) (08/08/23 1043)  Body mass index is 19.41 kg/m².       Physical Exam  Vitals reviewed.   Constitutional:       Appearance: She is ill-appearing.   HENT:      Head: Normocephalic and atraumatic.      Mouth/Throat:      Mouth: Mucous membranes are dry.   Eyes:      General: Scleral icterus present.      Pupils: Pupils are equal, round, and reactive to light.   Cardiovascular:      Rate and Rhythm: Regular rhythm. Tachycardia present.   Pulmonary:      Breath sounds: Normal breath sounds.   Abdominal:      General: There is distension.      Tenderness: There is abdominal tenderness.   Musculoskeletal:         General: No signs of injury.      Right lower leg: No edema.      Left lower leg: No edema.   Skin:     Coloration: Skin is jaundiced.   Neurological:      Mental Status: She is lethargic and confused.      GCS: GCS eye subscore is 4. GCS verbal subscore is 2. GCS motor subscore is 5.            MELD 3.0: 33 at 8/8/2023  9:28 AM  MELD-Na: 31 at 8/8/2023  9:28 AM  Calculated from:  Serum Creatinine: 1.6 mg/dL at 8/8/2023  9:28 AM  Serum Sodium: 134 mmol/L at 8/8/2023  9:28 AM  Total Bilirubin: 8.3 mg/dL at 8/8/2023  9:28 AM  Serum Albumin: 2.2 g/dL at 8/8/2023  9:28 AM  INR(ratio): 2.6 at 8/8/2023  9:28 AM  Age at listing (hypothetical): 67 years  Sex: Female at 8/8/2023  9:28 AM      Significant Labs:  CBC:   Recent Labs   Lab 08/08/23 0928   WBC 7.98   RBC 2.44*   HGB 7.8*   HCT 24.3*   PLT 57*       CMP:   Recent Labs   Lab 08/08/23 0928   *   CALCIUM 8.6*   ALBUMIN 2.2*   PROT 6.8   NA  134*   K 3.8   CO2 15*      BUN 22   CREATININE 1.6*   ALKPHOS 56   ALT 28   AST 81*   BILITOT 8.3*       Coagulation:   Recent Labs   Lab 08/07/23  1252 08/08/23  0928   INR 2.4* 2.6*   APTT 42.7*  --          Significant Imaging:  CT: Reviewed  CXR: appropriate placement and positioning of NG tube.

## 2023-08-08 NOTE — ASSESSMENT & PLAN NOTE
History of cirrhosis and prominent acuities on admission. Diagnostic paracentesis was performed in the ED with the following results:     Component Ref Range & Units 1 d ago   Body Fluid Type  Ascites    Fluid Appearance  Clear    Fluid Color  Yellow    WBC, Body Fluid /cu mm 54    Comment: Reference ranges for body fluids not established.   Correlate clinically.    Segs, Fluid % 8    Lymphs, Fluid % 49    Monocytes/Macrophages, Fluid % 34    Mesothelial Cells, Fluid % 9      Component Ref Range & Units 1 d ago   Body Fluid Source, LDH  Ascites    LD, Fluid Not established U/L 147      Component Ref Range & Units 1 d ago   Body Fluid Source, Total Protein  Ascites    Body Fluid, Protein Not established g/dL 1.4    Comment: Reference intervals have not been established for body fluids.   Comparison of this result with the concentration in the blood,   serum,or plasma is recommended.   The reference interval for total protein in serum/plasma is   0-3 years......5.4-7.4 g/dL   4-6 years......5.9-8.2 g/dL   >7 years.......6.0-8.4 g/dL   Please interpret in context with the clinical   picture.      Component Ref Range & Units 1 d ago   Body Fluid Source, Albumin  Ascites    Body Fluid, Albumin See text g/dL <0.4    Comment: Reference intervals have not been established for body fluids.   Comparison of this result with the concentration in the blood,   serum,or plasma is recommended.   The reference interval for albumin in serum/plasma is   3.5-5.2 g/dL. Please interpret in context with the clinical   picture.        Plan:   - IR diagnostic and therapeutic drainage   - SBP ruled out  - F/U cytology labs on fluid  - Echo to r/o cardiac contribution  - Hepatic ultrasound to illustrate extent of liver cirrhosis

## 2023-08-08 NOTE — ED NOTES
Continue with tube feedings.   Rectal tube unclamped and draining instilled fluid and dark stool.

## 2023-08-08 NOTE — ASSESSMENT & PLAN NOTE
Initial lactic acid 3.0 and marie to 6.0  No acute hemodynamic changes  -obtain blood cultures   -continue empiric antibiotics   -Give 1L of LR bolus, place mario catheter for strict UOP monitoring   -Order Albuming 50gram IV q8hrs x 3 doses.

## 2023-08-08 NOTE — ASSESSMENT & PLAN NOTE
Patients daughter reports she has much higher alcohol consumption history than previously documented.  Given report that patient acutely stopped drinking alcohol - she was at high risk for alcohol withdrawal and could also be high risk for developing Wernicke Encephalopathy.     Start treatment level thiamine for potential wernicke encephalopathy.    Obtain MRI of brain for further evaluation.

## 2023-08-08 NOTE — HPI
"66 y/o F with PMH of HCV and alcoholic cirrhosis, chronic anemia requiring transfusions, debility was admitted for worsening confusion and abd distention. Pt most recently [place] [facility] [place]5/[place]23 for weakness and abd distention. Pt found to have hgb of 5, received 2u pRBC and d/c home after no evidence of active bleeding was identified. Since that time pt has had worsnening of her sx. Per pt daughter, pt has had significantly decreased PO intake asscoiated with her confusion. Per daughter, pt has is still drinking approximately 12 beers daily and some liquor. In the ED pt had paracentesis, lactulose enema. Pt has been HDS but slightly tachycardic in since admission, Spo2 >97% on RA.    Hepatology consulted for "grade 4 HE with decompensated cirrhosis (HCV/ETOH)". Pt severely altered and unable to answer questions. daughter at bedside and able to proivde accurate history. Pt has had increasing confusion for past few days. No previous hx of AMS. Does not take home lactulose. Ammonia 138 on admission. Pt has also had worsening abd distention and pain. During recent admission in early July, pt d/c on spironolactone 100mg qd and lasix 20mg qd. Unsure if she has been taking this medications. CTAP this admission demonstrates cirrhotic liver and sequela of portal htn, large volume ascites. Paracentesis done yesterday. Pt started on rocephin. Pertinent results include AST//34, ALP 70, Tbili 8.5, Albumin 1.3, PT/INR 23.8/2.4, lactate uptrending to 6.0. Per record review, pt has hx of untreated chronic HCV first noted in 2015, most recently positive in 2/2023. MELD of 31.    "

## 2023-08-08 NOTE — HPI
Per admitting physician:  67-year-old woman with a history of HCV and alcoholic cirrhosis, alcohol dependence, chronic anemia requiring transfusions, debility who presents to the emergency department for concerns of worsening confusion, brought to ED by her daughter Kylee    Patient was seen at [facility] emergency department on August 5th with complaints of weakness abdominal swelling was noted on lab work to have a hemoglobin of 5 no reported active bleeding, she was given 2 units PRBCs and discharged home afterward.  Patient has not had any bleeding since but has had worsening abdominal swelling and progressively worsening confusion. She is not eating or drinking in recent days, daughter tried to force feed her some mashed potatoes but patient was only pocketing oral contents.     Her daughter states that 1 month ago she had multiple teeth pulled and at that time patient stop drinking alcohol cold turkey.  Per primary care notes patient was drinking 6 pack of beer per week but daughter states that patient's alcohol use was a much higher degree, daily consumption of brown liquor and upwards of 12 beers per day.  Patient did not have any acute treatment to prevent alcohol withdrawal.      No tobacco use, daughter gave pt some of her rx marijuana drops, yesterday.   IN ED patient had diagnostic paracentesis, lactulose enema ordered and given, fecal management system placed and .

## 2023-08-08 NOTE — CARE UPDATE
RAPID RESPONSE NURSE PROACTIVE ROUNDING NOTE       Time of Visit: 920    Admit Date: 2023  LOS: 1  Code Status: Full Code   Date of Visit: 2023  : 1955  Age: 67 y.o.  Sex: female  Race: Black or   Bed: 8083/8083 A:   MRN: [id number]  Was the patient discharged from an ICU this admission? No   Was the patient discharged from a PACU within last 24 hours? No   Did the patient receive conscious sedation/general anesthesia in last 24 hours? No  Was the patient in the ED within the past 24 hours? Yes  Was the patient on NIPPV within the past 24 hours? No   Attending Physician: Amara Quintanilla MD  Primary Service: [facility]   Time spent at the bedside: 15 -30 min    SITUATION    Notified by bedside RN via phone call.  Reason for alert: Decreased O2 levels of 85%  Called to evaluate the patient for Respiratory    BACKGROUND     Why is the patient in the hospital?: Hepatic encephalopathy    Patient has a past medical history of Age-related osteoporosis without current pathological fracture, Alcohol dependence, Anemia, Chronic hepatitis C without hepatic coma, Decompensated cirrhosis related to hepatitis C virus (HCV), Elevated liver enzymes, Hepatitis, History of blood transfusion, Other ascites, Seasonal allergies, and Symptomatic anemia.    Last Vitals:  Temp: 97 °F (36.1 °C) (732)  Pulse: 104 (732)  Resp: 15 (732)  BP: 123/80 (732)  SpO2: 97 % (507)    24 Hours Vitals Range:  Temp:  [96.4 °F (35.8 °C)-98.2 °F (36.8 °C)]   Pulse:  []   Resp:  [14-23]   BP: (112-138)/(66-82)   SpO2:  [97 %-100 %]     Labs:  Recent Labs     23  2347 23  1252 23  1310   WBC 6.09 4.57 6.31  --    HGB 5.8* 7.8* 9.7*  --    HCT 18.6* 23.2* 28.9* 27*   PLT 77* 51* 83*  --        Recent Labs     23  1252   * 132*   K 3.3* 3.7   CL 99 99   CO2 23 23   BUN 16 17   CREATININE 0.8 1.1   GLU 85 95   MG  --  2.1         Recent Labs     08/07/23  1310   PH 7.437   PCO2 40.8   PO2 27*   HCO3 27.5   POCSATURATED 52*   BE 3        ASSESSMENT    Physical Exam  Vitals and nursing note reviewed.   Neurological:      General: No focal deficit present.      Mental Status: She is lethargic and disoriented.     Pt lying in bed with family at bedside. Bedside RN called with concerns for decrease in O2 saturation to 85%. At that time pt was placed on NRB. On arrival patient remains on NRB with O2 @ 100%. RRN placed pt back on NC @ 5L- O2 % after doing so. Ammonia currently 138-Lactulose 10G enema ordered Q6H with 1st dose given @ 0400. Instructed bedside RN to call should patient's Oxygen demands increase again and to wean oxygen as tolerated.  bpm, RR 18-21, O2 %.     INTERVENTIONS    The patient was seen for Respiratory problem. Staff concerns included new onset of difficulty breathing, oxygen saturation < 90% despite supplemental oxygen, and increased oxygen requirements. The following interventions were performed: supplemental oxygen.      RECOMMENDATIONS    Wean oxygen as tolerated.  Continue Lactulose as ordered.     PROVIDER ESCALATION    Yes/No  No    Orders received and case discussed with NA.    Disposition: Remain in room 8083.    FOLLOW-UP    bedside Susan LANDIS  updated on plan of care. Instructed to call the Rapid Response Nurse, Sol Hickey RN at [phone] for additional questions or concerns.

## 2023-08-08 NOTE — NURSING
Patient arrived to unit via stretcher. Pt accompanied by daughter. Pt is confused, non verbal at this time. Daughter states she does blink her eyes to yes and no questions. Pt is alert and localized pain. Pt transfer to bed via three person assist. RR even and unlabored on room air. Vitals stable. Cabello to gravity and rectal tube to gravity. Incontinence care performed after transfer to bed. HOB elevated. Bed low and locked, call light within reach, side rails raised x 3, bed alarm activated. Will continue to monitor.

## 2023-08-08 NOTE — ASSESSMENT & PLAN NOTE
A formal, in-person discussion on 08/08/2023 at 14:53 was had with both family and spouse of mrs. Syl Basilio regarding end of life care and code status. Consciences was made with both Mr. Hossein Basilio () and family that the patient, lacking capacity will be classified as DNR/DNI code status.

## 2023-08-08 NOTE — NURSING TRANSFER
Nursing Transfer Note      8/8/2023   11:53 AM    Nurse giving handoff:Susan Ambrosio RN  Nurse receiving handoff:BOBY Eugene    Reason patient is being transferred: Higher level of care.     Transfer From: [facility] 80    Transfer via bed    Transfer with O2, cardiac monitoring    Transported by BOBY Sr & BOBY Davis    Transfer Vital Signs:  Blood Pressure:124/76  Heart Rate:101  O2:99  Temperature:98.1  Respirations:24    Telemetry: Rhythm ST  Order for Tele Monitor? Yes    Additional Lines: Oxygen and Cabello Catheter    4eyes on Skin: yes    Medicines sent: Vanc, Vit K, LR    Any special needs or follow-up needed:     Patient belongings transferred with patient: Yes    Chart send with patient: Yes    Notified: daughter    Patient reassessed at: 1143 8/8/23  1  Upon arrival to floor: cardiac monitor applied, patient oriented to room, call bell in reach, and bed in lowest position

## 2023-08-08 NOTE — ASSESSMENT & PLAN NOTE
- Hx of untreated HCV and alcoholic cirrhosis. No previous hx of HE,  Pt was A&O x3 and GCS 9, requiring NRB to keep sats wnl on 8/5.  Currently her mentation and alertness has drastically declined. Current GCS of 6-7 on 8/8. Encephalopathy likely mixed picture of decompensated cirrhosis and possible infection. Peritoneal fluid labs did not demonstrate an infectious contribution to etiology.  On admission; Ammonia 138, AST//34, ALP 70, Tbili 8.5, Albumin 1.3, PT/INR 23.8/2.4, UA with many bacteria, +WBC +RBC,  Lactate 10.3 (down trending, now 7.8),      Plan:  - Currently on rocephin  - Urine and blood Cx pending  - continue Wernicke protocol high dose thiamine, high threshold for CIWA  - titrate lactulose to 3-4 BM daily  - IR order for therapeutic paracentesis   - will likely need albumin replacement.  - avoid sedating medications.

## 2023-08-08 NOTE — NURSING
Nurses Note -- 4 Eyes        8/7/2023   10:10 PM        Skin assessed during: Admit        [x] No Altered Skin Integrity Present                 []Prevention Measures Documented        [] Yes- Altered Skin Integrity Present or Discovered              [] LDA Added if Not in Epic (Describe Wound)              [] New Altered Skin Integrity was Present on Admit and Documented in LDA              [] Wound Image Taken    Wound Care Consulted? No       Attending Nurse: Sandra Valencia RN     Second RN/Staff Member:  Jr RIOS

## 2023-08-08 NOTE — ASSESSMENT & PLAN NOTE
Nutrition Problem:  Severe Protein-Calorie Malnutrition  Malnutrition in the context of Social/Environmental Circumstances    Related to (etiology):  Inability to consume sufficient energy     Signs and Symptoms (as evidenced by):  Energy Intake: <50% of estimated energy requirement for > 1 month   Body Fat Depletion: severe depletion of orbitals, triceps and thoracic and lumbar region   Muscle Mass Depletion: severe depletion of temples, clavicle region, scapular region, interosseous muscle and lower extremities   Weight Loss: 16% x 2-3 months    Interventions(treatment strategy):  Collaboration of nutrition care w/ other providers  ADAT/ONS vs EN     Nutrition Diagnosis Status:  New

## 2023-08-08 NOTE — CONSULTS
"[facility] - Telemetry Stepdown  Hepatology  Consult Note    Patient Name: Syl Basilio  MRN: [id number]  Admission Date: 8/7/2023  Hospital Length of Stay: 1 days  Attending Provider: Amara Quintanilla MD   Primary Care Physician: Jeramie Self MD  Principal Problem:Hepatic encephalopathy    Inpatient consult to Hepatology  Consult performed by: Ramírez Bradford MD  Consult ordered by: Kole Mahmood MD        Subjective:         HPI:  68 y/o F with PMH of HCV and alcoholic cirrhosis, chronic anemia requiring transfusions, debility was admitted for worsening confusion and abd distention. Pt most recently seen in [facility] ED on 8/5/2023 for weakness and abd distention. Pt found to have hgb of 5, received 2u pRBC and d/c home after no evidence of active bleeding was identified. Since that time pt has had worsnening of her sx. Per pt daughter, pt has had significantly decreased PO intake associated with her confusion. Per daughter, pt has is still drinking approximately 12 beers daily and some liquor. In the ED pt had paracentesis, lactulose enema and rectal tube placed with dark stool noted. Pt has been HDS but slightly tachycardic in since admission, Spo2 >97% on RA.    Hepatology consulted for "grade 4 HE with decompensated cirrhosis (HCV/ETOH)". Pt severely altered and unable to answer questions. Per record review and daughter, pt has had increasing confusion for past few days. No previous hx of AMS. Does not take home lactulose. Ammonia 138 on admission. Pt has also had worsening abd distention and pain. During recent admission in early July, pt d/c on spironolactone 100mg qd and lasix 20mg qd. Unsure if she has been taking these medications. CTAP this admission demonstrates cirrhotic liver and sequela of portal htn, large volume ascites. Diagnostic paracentesis done yesterday, no evidence of SBP. Pt had EGD 4/2023, with no evidence of varices. Pt started on rocephin in ED. Pertinent results include " AST//34, ALP 70, Tbili 8.5, Albumin 1.3, PT/INR 23.8/2.4, lactate uptrending to 10.3. Stool in fecal management system appears melanic. Per record review, pt has hx of untreated chronic HCV first noted in , most recently positive in 2023. MELD of 31.        Review of Systems   Reason unable to perform ROS: altered mental status.       Past Medical History:   Diagnosis Date    Age-related osteoporosis without current pathological fracture 3/7/2023    Alcohol dependence 2023    Anemia     Chronic hepatitis C without hepatic coma 7/3/2023    Decompensated cirrhosis related to hepatitis C virus (HCV) 2023    Elevated liver enzymes     Hepatitis     hep C ab +    History of blood transfusion     in 80s    Other ascites 2023    Seasonal allergies     Symptomatic anemia 7/3/2023       Past Surgical History:   Procedure Laterality Date     SECTION      COLONOSCOPY N/A 2023    Procedure: COLONOSCOPY;  Surgeon: Analisa Bhandari MD;  Location: [facility];  Service: Endoscopy;  Laterality: N/A;    ESOPHAGOGASTRODUODENOSCOPY N/A 2023    Procedure: EGD (ESOPHAGOGASTRODUODENOSCOPY);  Surgeon: Analisa Bhandari MD;  Location: [facility];  Service: Endoscopy;  Laterality: N/A;    HYSTERECTOMY             Review of patient's allergies indicates:   Allergen Reactions    Codeine Nausea And Vomiting         Tobacco Use    Smoking status: Never    Smokeless tobacco: Never   Substance and Sexual Activity    Alcohol use: Yes     Comment: 2 times per month 4 beers    Drug use: No    Sexual activity: Not on file       Medications Prior to Admission   Medication Sig Dispense Refill Last Dose    bismuth subsalicylate (PEPTO-BISMOL) 262 mg/15 mL suspension Take 30 mLs by mouth daily as needed for Indigestion.       CYANOCOBALAMIN, VITAMIN B-12, ORAL Take 1,000 mcg by mouth once daily.       DULCOLAX, BISACODYL, ORAL Take 1 tablet by mouth daily as needed (Constipation).       loratadine (CLARITIN) 10 mg  tablet Take 10 mg by mouth daily as needed for Allergies.       ondansetron (ZOFRAN) 4 MG tablet Take 4 mg by mouth every 8 (eight) hours as needed.       polyethylene glycol 400 (VISINE DRY EYE RELIEF OPHT) Place 1 drop into both eyes daily as needed (Dry eye).          Objective:     Vital Signs (Most Recent):  Temp: 97 °F (36.1 °C) (08/08/23 0732)  Pulse: 104 (08/08/23 0732)  Resp: 15 (08/08/23 0732)  BP: 123/80 (08/08/23 0732)  SpO2: 97 % (08/08/23 0507) Vital Signs (24h Range):  Temp:  [96.4 °F (35.8 °C)-98.2 °F (36.8 °C)] 97 °F (36.1 °C)  Pulse:  [] 104  Resp:  [14-23] 15  SpO2:  [97 %-100 %] 97 %  BP: (112-138)/(66-82) 123/80     Weight: 49.7 kg (109 lb 9.1 oz) (08/08/23 0400)  Body mass index is 19.41 kg/m².       Physical Exam  Vitals reviewed.   Constitutional:       Appearance: She is ill-appearing.   HENT:      Head: Normocephalic and atraumatic.      Mouth/Throat:      Mouth: Mucous membranes are dry.   Eyes:      General: Scleral icterus present.      Pupils: Pupils are equal, round, and reactive to light.   Cardiovascular:      Rate and Rhythm: Regular rhythm. Tachycardia present.   Pulmonary:      Breath sounds: decreased breath sounds.   Abdominal:      General: There is distension.      Tenderness: There is abdominal tenderness.   Musculoskeletal:         General: No signs of injury.      Right lower leg: No edema.      Left lower leg: No edema.   Skin:     Coloration: Skin is jaundiced.   Neurological:      Mental Status: She is lethargic and confused.      GCS: GCS eye subscore is 4. GCS verbal subscore is 2. GCS motor subscore is 5.            MELD 3.0: 31 at 8/7/2023 12:52 PM  MELD-Na: 27 at 8/7/2023 12:52 PM  Calculated from:  Serum Creatinine: 1.1 mg/dL at 8/7/2023 12:52 PM  Serum Sodium: 132 mmol/L at 8/7/2023 12:52 PM  Total Bilirubin: 8.5 mg/dL at 8/7/2023 12:52 PM  Serum Albumin: 1.3 g/dL (Using min of 1.5 g/dL) at 8/7/2023 12:52 PM  INR(ratio): 2.4 at 8/7/2023 12:52 PM  Age at  "listing (hypothetical): 67 years  Sex: Female at 8/7/2023 12:52 PM      Significant Labs:  CBC:   Recent Labs   Lab 08/07/23  1252 08/07/23  1310   WBC 6.31  --    RBC 3.04*  --    HGB 9.7*  --    HCT 28.9* 27*   PLT 83*  --      CMP:   Recent Labs   Lab 08/07/23  1252   GLU 95   CALCIUM 8.0*   ALBUMIN 1.3*   PROT 7.6   *   K 3.7   CO2 23   CL 99   BUN 17   CREATININE 1.1   ALKPHOS 70   ALT 34   *   BILITOT 8.5*     Coagulation:   Recent Labs   Lab 08/07/23  1252   INR 2.4*   APTT 42.7*       Significant Imaging:  CT: Reviewed    Assessment/Plan:     GI  * Hepatic encephalopathy  - Hx of untreated HCV and alcoholic cirrhosis. No previous hx of HE,  Pt was A&O x3 on 8/5.    - GCS 9, requiring NRB to keep sats wnl   - recommend elevation of care to ICU  - encephalopathy likely mixed picture of decompensated cirrhosis and infection   - AST//34   - ALP 70   - Tbili 8.5   - Albumin 1.3   - PT/INR 23.8/2.4   - UA with many bacteria, +WBC +RBC    - Currently on rocephin    - Urine Cx pending   - Lactate uptrending since admission 3.4 -> 6.0 -> 10.3    - Received 1L LR bolus    - Recommend resuscitation with albumin   - Bcx pending   - Recommend broad spectrum Abx  - continue Wernicke protocol high dose thiamine  - titrate lactulose to 3-4 BM daily   - Ammonia 138 on admission  - diagnostic paracentesis 8/7: no evidence of SBP  - recommend therapeutic paracentesis    - will likely need albumin replacement.  - avoid sedating medications        Decompensated cirrhosis related to hepatitis C virus (HCV)  See "hepatic encephalopathy"        Thank you for your consult. I will follow-up with patient. Please contact us if you have any additional questions.    Ramírez Bradford MD  Hepatology  [facility] - Telemetry Stepdown  "

## 2023-08-08 NOTE — ASSESSMENT & PLAN NOTE
Previously documented reports she has much higher alcohol consumption history than previously documented.  Given report that patient acutely stopped drinking alcohol - she was at high risk for alcohol withdrawal and could also be high risk for developing Wernicke Encephalopathy.      Start treatment level thiamine for potential wernicke encephalopathy.    MRI of brain: No MR findings to suggest Wernicke's encephalopathy.  Short-term follow-up suggested.Generalized cerebral volume loss.Mild left mastoid air cell effusion

## 2023-08-08 NOTE — H&P
"[facility] - [facility] Intensive Care  Critical Care Medicine  History & Physical    Patient Name: Syl Basilio  MRN: [id number]  Admission Date: 8/7/2023  Hospital Length of Stay: 1 days  Code Status: Full Code  Attending Physician: Moe Carmona*   Primary Care Provider: Jeramie Self MD   Principal Problem: Hepatic encephalopathy    Subjective:     HPI:  68 y/o F with PMH of HCV and alcoholic cirrhosis, chronic anemia requiring transfusions, debility was admitted for worsening confusion and abd distention. Pt most recently seen in [facility] [place] ED on 8/5/2023 for weakness and abd distention. Pt found to have hgb of 5, received 2u pRBC and d/c home after no evidence of active bleeding was identified. Since that time pt has had worsnening of her sx. Per pt daughter, pt has had significantly decreased PO intake associated with her confusion. Per daughter, pt has is still drinking approximately 12 beers daily and some liquor. In the ED pt had paracentesis, lactulose enema and rectal tube placed with dark stool noted. Pt has been HDS but slightly tachycardic in since admission, Spo2 >97% on RA.     Hepatology consulted for "grade 4 HE with decompensated cirrhosis (HCV/ETOH)". Pt severely altered and unable to answer questions. Per record review and daughter, pt has had increasing confusion for past few days. No previous hx of AMS. Does not take home lactulose. Ammonia 138 on admission. Pt has also had worsening abd distention and pain. During recent admission in early July, pt d/c on spironolactone 100mg qd and lasix 20mg qd. Unsure if she has been taking these medications. CTAP this admission demonstrates cirrhotic liver and sequela of portal htn, large volume ascites. Diagnostic paracentesis done yesterday, no evidence of SBP. Pt had EGD 4/2023, with no evidence of varices. Pt started on rocephin in ED. Pertinent results include AST//34, ALP 70, Tbili 8.5, Albumin 1.3, PT/INR 23.8/2.4, lactate " uptrending to 10.3. Stool in fecal management system appears melanic. Per record review, pt has hx of untreated chronic HCV first noted in , most recently positive in 2023. MELD of 31.      Hospital/ICU Course:  No notes on file     Review of Systems   Reason unable to perform ROS: altered mental status.       Past Medical History:   Diagnosis Date    Age-related osteoporosis without current pathological fracture 3/7/2023    Alcohol dependence 2023    Anemia     Chronic hepatitis C without hepatic coma 7/3/2023    Decompensated cirrhosis related to hepatitis C virus (HCV) 2023    Elevated liver enzymes     Hepatitis     hep C ab +    History of blood transfusion     in 80s    Other ascites 2023    Seasonal allergies     Symptomatic anemia 7/3/2023       Past Surgical History:   Procedure Laterality Date     SECTION      COLONOSCOPY N/A 2023    Procedure: COLONOSCOPY;  Surgeon: Analisa Bhandari MD;  Location: [facility];  Service: Endoscopy;  Laterality: N/A;    ESOPHAGOGASTRODUODENOSCOPY N/A 2023    Procedure: EGD (ESOPHAGOGASTRODUODENOSCOPY);  Surgeon: Analisa Bhandari MD;  Location: [facility];  Service: Endoscopy;  Laterality: N/A;    HYSTERECTOMY             Review of patient's allergies indicates:   Allergen Reactions    Codeine Nausea And Vomiting         Tobacco Use    Smoking status: Never    Smokeless tobacco: Never   Substance and Sexual Activity    Alcohol use: Yes     Comment: 2 times per month 4 beers    Drug use: No    Sexual activity: Not on file       Medications Prior to Admission   Medication Sig Dispense Refill Last Dose    bismuth subsalicylate (PEPTO-BISMOL) 262 mg/15 mL suspension Take 30 mLs by mouth daily as needed for Indigestion.       CYANOCOBALAMIN, VITAMIN B-12, ORAL Take 1,000 mcg by mouth once daily.       DULCOLAX, BISACODYL, ORAL Take 1 tablet by mouth daily as needed (Constipation).       loratadine (CLARITIN) 10 mg tablet  Take 10 mg by mouth daily as needed for Allergies.       ondansetron (ZOFRAN) 4 MG tablet Take 4 mg by mouth every 8 (eight) hours as needed.       polyethylene glycol 400 (VISINE DRY EYE RELIEF OPHT) Place 1 drop into both eyes daily as needed (Dry eye).          Objective:     Vital Signs (Most Recent):  Temp: 98.3 °F (36.8 °C) (08/08/23 1145)  Pulse: 101 (08/08/23 1400)  Resp: 18 (08/08/23 1400)  BP: 127/77 (08/08/23 1400)  SpO2: 100 % (08/08/23 1400) Vital Signs (24h Range):  Temp:  [96.4 °F (35.8 °C)-98.3 °F (36.8 °C)] 98.3 °F (36.8 °C)  Pulse:  [] 101  Resp:  [15-28] 18  SpO2:  [97 %-100 %] 100 %  BP: (112-140)/(34-83) 127/77     Weight: 49.7 kg (109 lb 9.1 oz) (08/08/23 1043)  Body mass index is 19.41 kg/m².      Physical Exam  Vitals reviewed.   Constitutional:       Appearance: She is ill-appearing.   HENT:      Head: Normocephalic and atraumatic.      Mouth/Throat:      Mouth: Mucous membranes are dry.   Eyes:      General: Scleral icterus present.      Pupils: Pupils are equal, round, and reactive to light.   Cardiovascular:      Rate and Rhythm: Regular rhythm. Tachycardia present.   Pulmonary:      Breath sounds: Normal breath sounds.   Abdominal:      General: There is distension.      Tenderness: There is abdominal tenderness.   Musculoskeletal:         General: No signs of injury.      Right lower leg: No edema.      Left lower leg: No edema.   Skin:     Coloration: Skin is jaundiced.   Neurological:      Mental Status: She is lethargic and confused.      GCS: GCS eye subscore is 4. GCS verbal subscore is 2. GCS motor subscore is 5.            MELD 3.0: 33 at 8/8/2023  9:28 AM  MELD-Na: 31 at 8/8/2023  9:28 AM  Calculated from:  Serum Creatinine: 1.6 mg/dL at 8/8/2023  9:28 AM  Serum Sodium: 134 mmol/L at 8/8/2023  9:28 AM  Total Bilirubin: 8.3 mg/dL at 8/8/2023  9:28 AM  Serum Albumin: 2.2 g/dL at 8/8/2023  9:28 AM  INR(ratio): 2.6 at 8/8/2023  9:28 AM  Age at listing (hypothetical): 67  years  Sex: Female at 8/8/2023  9:28 AM      Significant Labs:  CBC:   Recent Labs   Lab 08/08/23  0928   WBC 7.98   RBC 2.44*   HGB 7.8*   HCT 24.3*   PLT 57*       CMP:   Recent Labs   Lab 08/08/23 0928   *   CALCIUM 8.6*   ALBUMIN 2.2*   PROT 6.8   *   K 3.8   CO2 15*      BUN 22   CREATININE 1.6*   ALKPHOS 56   ALT 28   AST 81*   BILITOT 8.3*       Coagulation:   Recent Labs   Lab 08/07/23  1252 08/08/23 0928   INR 2.4* 2.6*   APTT 42.7*  --          Significant Imaging:  CT: Reviewed  CXR: appropriate placement and positioning of NG tube.    Assessment/Plan:     Psychiatric  Alcohol dependence  Previously documented reports she has much higher alcohol consumption history than previously documented.  Given report that patient acutely stopped drinking alcohol - she was at high risk for alcohol withdrawal and could also be high risk for developing Wernicke Encephalopathy.      Start treatment level thiamine for potential wernicke encephalopathy.    MRI of brain: No MR findings to suggest Wernicke's encephalopathy.  Short-term follow-up suggested.Generalized cerebral volume loss.Mild left mastoid air cell effusion      Renal/  Lactic acidosis  Lactic acid on admittion was 7.8, peaked at 10 and returned to 7.8 post lactulose. No acute hemodynamic changes.  -Blood Cx results pending   -continue empiric antibiotics   -Give 1L of LR bolus, place mario catheter for strict UOP monitoring   -Order Albuming 50gram IV q8hrs x 3 doses.   - serial lactates until resolution     Oncology  Anemia of chronic disease  Sending anemia studes - given 2 units PRBC earlier this week and hgb is stable at this time. H&H is 7.8/24    Plan:  - transfuse if Hg < 7.0    Endocrine  Severe malnutrition  Nutrition consulted. Most recent weight and BMI monitored-     Measurements:  Wt Readings from Last 1 Encounters:   08/08/23 49.7 kg (109 lb 9.1 oz)   Body mass index is 19.41 kg/m².    Patient has been screened and  assessed by RD.    Malnutrition Type:  Context: social/environmental circumstances  Level: severe    Malnutrition Characteristic Summary:  Weight Loss (Malnutrition): greater than 7.5% in 3 months  Energy Intake (Malnutrition): less than or equal to 50% for greater than or equal to 1 month  Subcutaneous Fat (Malnutrition): severe depletion  Muscle Mass (Malnutrition): severe depletion    Recommendations     1. Diet advancement per SLP. Rec'd Low Na diet + Boost Breeze ONS.  2. If unable to advance diet, place NGT & initiate TFs. Rec'd Isosource 1.5 @ 45 mL/hr = 1620 kcals, 73 g of protein, 825 mL fluid.  - Please advance rate slowly & monitor for re-feeding syndrome.    3. RD to monitor & follow-up.     Goals: Meet % EEN, EPN by RD f/u date  Nutrition Goal Status: new  Communication of RD Recs: other (comment) (POC)    GI  * Hepatic encephalopathy  - Hx of untreated HCV and alcoholic cirrhosis. No previous hx of HE,  Pt was A&O x3 and GCS 9, requiring NRB to keep sats wnl on 8/5.  Currently her mentation and alertness has drastically declined. Current GCS of 6-7 on 8/8. Encephalopathy likely mixed picture of decompensated cirrhosis and possible infection. Peritoneal fluid labs did not demonstrate an infectious contribution to etiology.  On admission; Ammonia 138, AST//34, ALP 70, Tbili 8.5, Albumin 1.3, PT/INR 23.8/2.4, UA with many bacteria, +WBC +RBC,  Lactate 10.3 (down trending, now 7.8),      Plan:  - Currently on rocephin  - Urine and blood Cx pending  - continue Wernicke protocol high dose thiamine, high threshold for CIWA  - titrate lactulose to 3-4 BM daily  - IR order for therapeutic paracentesis   - will likely need albumin replacement.  - avoid sedating medications.    Other ascites  History of cirrhosis and prominent acuities on admission. Diagnostic paracentesis was performed in the ED with the following results:     Component Ref Range & Units 1 d ago   Body Fluid Type  Ascites    Fluid  "Appearance  Clear    Fluid Color  Yellow    WBC, Body Fluid /cu mm 54    Comment: Reference ranges for body fluids not established.   Correlate clinically.    Segs, Fluid % 8    Lymphs, Fluid % 49    Monocytes/Macrophages, Fluid % 34    Mesothelial Cells, Fluid % 9      Component Ref Range & Units 1 d ago   Body Fluid Source, LDH  Ascites    LD, Fluid Not established U/L 147      Component Ref Range & Units 1 d ago   Body Fluid Source, Total Protein  Ascites    Body Fluid, Protein Not established g/dL 1.4    Comment: Reference intervals have not been established for body fluids.   Comparison of this result with the concentration in the blood,   serum,or plasma is recommended.   The reference interval for total protein in serum/plasma is   0-3 years......5.4-7.4 g/dL   4-6 years......5.9-8.2 g/dL   >7 years.......6.0-8.4 g/dL   Please interpret in context with the clinical   picture.      Component Ref Range & Units 1 d ago   Body Fluid Source, Albumin  Ascites    Body Fluid, Albumin See text g/dL <0.4    Comment: Reference intervals have not been established for body fluids.   Comparison of this result with the concentration in the blood,   serum,or plasma is recommended.   The reference interval for albumin in serum/plasma is   3.5-5.2 g/dL. Please interpret in context with the clinical   picture.        Plan:   - IR diagnostic and therapeutic drainage   - SBP ruled out  - F/U cytology labs on fluid  - Echo to r/o cardiac contribution  - Hepatic ultrasound to illustrate extent of liver cirrhosis      Decompensated cirrhosis related to hepatitis C virus (HCV)  See "hepatic encephalopathy"    Palliative Care  ACP (advance care planning)  A formal, in-person discussion on 08/08/2023 at 14:53 was had with both family and spouse of mrs. Syl Basilio regarding end of life care and code status. Consciences was made with both Mr. Hossein Basilio () and family that the patient, lacking capacity will be classified as " DNR/DNI code status.     Other  Weakness  The patient is somulent throughout the day and nonresponsive to verbal que's. GCS on exam is 6-7.  Muscular tone is diminished and appearance cachectic. Likely due to HE of malnutrition.     Plan:   - consult nutrition for dietary needs        Critical Care Daily Checklist:    A: Awake: RASS Goal/Actual Goal:    Actual:     B: Spontaneous Breathing Trial Performed?  n/a   C: SAT & SBT Coordinated?  Patient not on mechanical ventilation                       D: Delirium: CAM-ICU Overall CAM-ICU: Positive   E: Early Mobility Performed? Yes   F: Feeding Goal: Goals: Meet % EEN, EPN by RD f/u date  Status: Nutrition Goal Status: new   Current Diet Order   Procedures    Diet NPO Except for: Medication, Sips with Medication     Order Specific Question:   Except for     Answer:   Medication     Order Specific Question:   Except for     Answer:   Sips with Medication      AS: Analgesia/Sedation None   T: Thromboembolic Prophylaxis Held 2:2 thrombocytopenia   H: HOB > 300 Yes   U: Stress Ulcer Prophylaxis (if needed) Famotidine   G: Glucose Control At goal   B: Bowel Function Stool Occurrence: 1   I: Indwelling Catheter (Lines & Mario) Necessity Rectal tube, mario, PIV, NG tube   D: De-escalation of Antimicrobials/Pharmacotherapies None at this time    Plan for the day/ETD Trend and reduce lactate levels    Code Status:  Family/Goals of Care: DNR/DNI   Continue goals of care       Critical secondary to Patient has a condition that poses threat to life and bodily function: Severe Respiratory Distress     Critical care was time spent personally by me on the following activities: development of treatment plan with patient or surrogate and bedside caregivers, discussions with consultants, evaluation of patient's response to treatment, examination of patient, ordering and performing treatments and interventions, ordering and review of laboratory studies, ordering and review of  radiographic studies, pulse oximetry, re-evaluation of patient's condition. This critical care time did not overlap with that of any other provider or involve time for any procedures.     Fernanda Gonzales MD  Critical Care Medicine  [facility]

## 2023-08-08 NOTE — ASSESSMENT & PLAN NOTE
Nutrition consulted. Most recent weight and BMI monitored-     Measurements:  Wt Readings from Last 1 Encounters:   08/08/23 49.7 kg (109 lb 9.1 oz)   Body mass index is 19.41 kg/m².    Patient has been screened and assessed by RD.    Malnutrition Type:  Context: social/environmental circumstances  Level: severe    Malnutrition Characteristic Summary:  Weight Loss (Malnutrition): greater than 7.5% in 3 months  Energy Intake (Malnutrition): less than or equal to 50% for greater than or equal to 1 month  Subcutaneous Fat (Malnutrition): severe depletion  Muscle Mass (Malnutrition): severe depletion    Recommendations     1. Diet advancement per SLP. Rec'd Low Na diet + Boost Breeze ONS.  2. If unable to advance diet, place NGT & initiate TFs. Rec'd Isosource 1.5 @ 45 mL/hr = 1620 kcals, 73 g of protein, 825 mL fluid.  - Please advance rate slowly & monitor for re-feeding syndrome.    3. RD to monitor & follow-up.     Goals: Meet % EEN, EPN by RD f/u date  Nutrition Goal Status: new  Communication of RD Recs: other (comment) (POC)

## 2023-08-08 NOTE — ASSESSMENT & PLAN NOTE
The patient is somulent throughout the day and nonresponsive to verbal que's. GCS on exam is 6-7.  Muscular tone is diminished and appearance cachectic. Likely due to HE of malnutrition.     Plan:   - consult nutrition for dietary needs

## 2023-08-08 NOTE — CODE/ RAPID DOCUMENTATION
"RAPID RESPONSE NURSE AI ALERT       AI alert received.    Chart Reviewed: 08/07/2023, 8:02 PM    MRN: [id number]  Bed: ED 36/36    Dx: Hepatic encephalopathy    Syl Basilio has a past medical history of Anemia, Elevated liver enzymes, Hepatitis, History of blood transfusion, and Seasonal allergies.    Last VS: /69   Pulse 102   Temp 98.2 °F (36.8 °C) (Oral)   Resp 18   Ht 5' 3" (1.6 m)   Wt 62.9 kg (138 lb 10.7 oz)   SpO2 100%   BMI 24.56 kg/m²     24H Vital Sign Range:  Temp:  [98.2 °F (36.8 °C)]   Pulse:  []   Resp:  [14-23]   BP: (113-131)/(66-82)   SpO2:  [97 %-100 %]     Level of Consciousness (AVPU): alert    Recent Labs     08/05/23  1713 08/05/23  2347 08/07/23  1252 08/07/23  1310   WBC 6.09 4.57 6.31  --    HGB 5.8* 7.8* 9.7*  --    HCT 18.6* 23.2* 28.9* 27*   PLT 77* 51* 83*  --        Recent Labs     08/05/23  1713 08/07/23  1252   * 132*   K 3.3* 3.7   CL 99 99   CO2 23 23   BUN 16 17   CREATININE 0.8 1.1   GLU 85 95   MG  --  2.1        Recent Labs     08/07/23  1310   PH 7.437   PCO2 40.8   PO2 27*   HCO3 27.5   POCSATURATED 52*   BE 3        OXYGEN: 100% room air             MEWS score:  3    Bedside Ashlie LANDIS  contacted for AI alert for unfiled vital signs with low SaO2 and increased respiration rate. No concerns verbalized at this time. Instructed to call [phone] for further concerns or assistance.    Isabel Dumont RN        "

## 2023-08-08 NOTE — H&P
[facility] - Telemetry [facility] Medicine  History & Physical    Patient Name: Syl Basilio  MRN: [id number]  Patient Class: IP- Inpatient  Admission Date: 8/7/2023  Attending Physician: Amara Quintanilla MD [facility] HOSP MED L  Admitting Physician: Kole Mahmood MD  Primary Care Provider: Jeramie Self MD    Patient information was obtained from patient, past medical records and ER records.     Subjective:     Principal Problem:Hepatic encephalopathy    Chief Complaint:   Chief Complaint   Patient presents with    Fatigue     Seen at [place] on Saturday hx of cirrhosis         HPI: 67-year-old woman with a history of HCV and alcoholic cirrhosis, alcohol dependence, chronic anemia requiring transfusions, debility who presents to the emergency department for concerns of worsening confusion, brought to ED by her daughter Kylee    Patient was seen at [facility] emergency department on August 5th with complaints of weakness abdominal swelling was noted on lab work to have a hemoglobin of 5 no reported active bleeding, she was given 2 units PRBCs and discharged home afterward.  Patient has not had any bleeding since but has had worsening abdominal swelling and progressively worsening confusion. She is not eating or drinking in recent days, daughter tried to force feed her some mashed potatoes but patient was only pocketing oral contents.     Her daughter states that 1 month ago she had multiple teeth pulled and at that time patient stop drinking alcohol cold turkey.  Per primary care notes patient was drinking 6 pack of beer per week but daughter states that patient's alcohol use was a much higher degree, daily consumption of brown liquor and upwards of 12 beers per day.  Patient did not have any acute treatment to prevent alcohol withdrawal.      No tobacco use, daughter gave pt some of her rx marijuana drops, yesterday.   IN ED patient had diagnostic paracentesis, lactulose enema ordered and  given, fecal management system placed.       Past Medical History:   Diagnosis Date    Age-related osteoporosis without current pathological fracture 3/7/2023    Alcohol dependence 2023    Anemia     Chronic hepatitis C without hepatic coma 7/3/2023    Decompensated cirrhosis related to hepatitis C virus (HCV) 2023    Elevated liver enzymes     Hepatitis     hep C ab +    History of blood transfusion     in 80s    Other ascites 2023    Seasonal allergies     Symptomatic anemia 7/3/2023       Past Surgical History:   Procedure Laterality Date     SECTION      COLONOSCOPY N/A 2023    Procedure: COLONOSCOPY;  Surgeon: Analisa Bhandari MD;  Location: [facility];  Service: Endoscopy;  Laterality: N/A;    ESOPHAGOGASTRODUODENOSCOPY N/A 2023    Procedure: EGD (ESOPHAGOGASTRODUODENOSCOPY);  Surgeon: Analisa Bhandari MD;  Location: [facility];  Service: Endoscopy;  Laterality: N/A;    HYSTERECTOMY         Review of patient's allergies indicates:   Allergen Reactions    Codeine Nausea And Vomiting       No current facility-administered medications on file prior to encounter.     Current Outpatient Medications on File Prior to Encounter   Medication Sig    bismuth subsalicylate (PEPTO-BISMOL) 262 mg/15 mL suspension Take 30 mLs by mouth daily as needed for Indigestion.    CYANOCOBALAMIN, VITAMIN B-12, ORAL Take 1,000 mcg by mouth once daily.    DULCOLAX, BISACODYL, ORAL Take 1 tablet by mouth daily as needed (Constipation).    loratadine (CLARITIN) 10 mg tablet Take 10 mg by mouth daily as needed for Allergies.    ondansetron (ZOFRAN) 4 MG tablet Take 4 mg by mouth every 8 (eight) hours as needed.    polyethylene glycol 400 (VISINE DRY EYE RELIEF OPHT) Place 1 drop into both eyes daily as needed (Dry eye).     Family History       Problem Relation (Age of Onset)    Diabetes Mother, Maternal Grandmother, Daughter    Lung cancer Father          Tobacco Use    Smoking status: Never    Smokeless tobacco:  Never   Substance and Sexual Activity    Alcohol use: Yes     Comment: 2 times per month 4 beers    Drug use: No    Sexual activity: Not on file     Review of Systems   Unable to perform ROS: Mental status change     Objective:     Vital Signs (Most Recent):  Temp: 97.5 °F (36.4 °C) (08/08/23 0008)  Pulse: 108 (08/08/23 0008)  Resp: 20 (08/08/23 0008)  BP: 128/73 (08/08/23 0008)  SpO2: 100 % (08/08/23 0008) Vital Signs (24h Range):  Temp:  [96.4 °F (35.8 °C)-98.2 °F (36.8 °C)] 97.5 °F (36.4 °C)  Pulse:  [] 108  Resp:  [14-23] 20  SpO2:  [97 %-100 %] 100 %  BP: (112-138)/(66-82) 128/73     Weight: 62.9 kg (138 lb 10.7 oz)  Body mass index is 24.56 kg/m².     Physical Exam  Vitals reviewed.   Constitutional:       Appearance: She is cachectic. She is ill-appearing and toxic-appearing.   Eyes:      General: Scleral icterus present.      Pupils: Pupils are equal, round, and reactive to light.   Cardiovascular:      Rate and Rhythm: Regular rhythm. Tachycardia present.   Pulmonary:      Effort: Pulmonary effort is normal.      Breath sounds: No wheezing or rales.   Abdominal:      General: Bowel sounds are normal. There is distension.      Palpations: Abdomen is soft. There is fluid wave.      Tenderness: There is no abdominal tenderness.      Comments: ascites   Musculoskeletal:      Right lower leg: Edema present.      Left lower leg: Edema present.   Skin:     General: Skin is warm and dry.   Neurological:      Mental Status: She is unresponsive.      GCS: GCS eye subscore is 4. GCS verbal subscore is 1. GCS motor subscore is 2.              CRANIAL NERVES     CN III, IV, VI   Pupils are equal, round, and reactive to light.       Significant Labs: All pertinent labs within the past 24 hours have been reviewed.  CBC:   Recent Labs   Lab 08/07/23  1252 08/07/23  1310   WBC 6.31  --    HGB 9.7*  --    HCT 28.9* 27*   PLT 83*  --      CMP:   Recent Labs   Lab 08/07/23  1252   *   K 3.7   CL 99   CO2 23   GLU  95   BUN 17   CREATININE 1.1   CALCIUM 8.0*   PROT 7.6   ALBUMIN 1.3*   BILITOT 8.5*   ALKPHOS 70   *   ALT 34   ANIONGAP 10     Cardiac Markers:   Recent Labs   Lab 08/07/23  1252   BNP 70     Coagulation:   Recent Labs   Lab 08/07/23  1252   INR 2.4*   APTT 42.7*     Lactic Acid:   Recent Labs   Lab 08/07/23  1252 08/07/23  1921   LACTATE 3.4* 6.0*     Magnesium:   Recent Labs   Lab 08/07/23  1252   MG 2.1     Urine Studies:   Recent Labs   Lab 08/07/23  1547   COLORU Yellow   APPEARANCEUA Ex.Turbid   PHUR 6.0   SPECGRAV >1.030*   PROTEINUA 1+*   GLUCUA Negative   KETONESU Negative   BILIRUBINUA 2+*   OCCULTUA 2+*   NITRITE Negative   LEUKOCYTESUR Trace*   RBCUA >100*   WBCUA 18*   BACTERIA Many*   SQUAMEPITHEL 36   HYALINECASTS 61*       Significant Imaging: I have reviewed all pertinent imaging results/findings within the past 24 hours.    CT HEAD WITHOUT CONTRAST     CLINICAL HISTORY:  Mental status change, unknown cause;     TECHNIQUE:  Low dose axial CT images obtained throughout the head without the use of intravenous contrast.  Axial, sagittal and coronal reconstructions were performed.     COMPARISON:  CT head 12/10/2016     FINDINGS:  Intracranial compartment:     Ventricles and sulci are normal in size for age without evidence of hydrocephalus.     Bilateral basal ganglia calcifications, unchanged from comparison imaging.  Scattered hypodense foci scattered throughout periventricular white matter, which is nonspecific but can be seen with chronic microvascular ischemic disease.  No new parenchymal hemorrhage, edema, mass effect or major vascular territory infarct.     No extra-axial blood or fluid collections.     Skull/extracranial contents (limited evaluation):     No displaced calvarial fracture.     The mastoid air cells and visualized paranasal sinuses are essentially clear.     Impression:     No acute intracranial pathology.  If concern persists for acute ischemic process, consider MRI  brain without contrast for further evaluation.     Electronically signed by resident: Darrell Smith  Date:                                            08/07/2023  Time:                                           15:26     Electronically signed by: Tommie Garcia  Date:                                            08/07/2023  Time:                                           15:49    XR CHEST 1 VIEW     CLINICAL HISTORY:  Weakness     TECHNIQUE:  Single frontal view of the chest was performed.     COMPARISON:  03/17/2017     FINDINGS:  Cardiac size is normal.  Aorta is tortuous.  Lungs are clear.     Impression:     See above        Electronically signed by: Live Cameron MD  Date:                                            08/07/2023  Time:                                           13:40    Assessment/Plan:     * Hepatic encephalopathy  -Grade 4 hepatic encephalopathy, GCS 6-7  -nursing/ED Md report patient was speaking on arrival but on my exam pt moaning and is not responsive to sternal rub  -can continue lactulose enemas  -NPO strict - not safe to take any oral meds.  Discuss with family re: potential NG tube placement.   -hepatology Consult in AM   -s/p CTA abdomen and diagnostic paracentesis, Given dose of Ceftriaxone.   -f/u pending ascitic WBC count.     Lactic acidosis  Initial lactic acid 3.0 and marie to 6.0  No acute hemodynamic changes  -obtain blood cultures   -continue empiric antibiotics   -Give 1L of LR bolus, place mario catheter for strict UOP monitoring   -Order Albuming 50gram IV q8hrs x 3 doses.       Decompensated cirrhosis related to hepatitis C virus (HCV)  -patient was not taking any Rx meds prior to admission  -If patient recovers from severe encephalopathic state and renal function stable - restart on Lasix and Spironolactine at Lasix 40mg daily + Spironolactone 100mg daily       Other ascites  As per decompensated cirrhosis     F/u pending Ascitic fluid studies to r/o  SBP      Weakness  Fall/aspiration precautions      Anemia of chronic disease  Sending anemia studes - given 2 units PRBC earlier this week and hgb is stable at this time       Alcohol dependence  Patients daughter reports she has much higher alcohol consumption history than previously documented.  Given report that patient acutely stopped drinking alcohol - she was at high risk for alcohol withdrawal and could also be high risk for developing Wernicke Encephalopathy.     Start treatment level thiamine for potential wernicke encephalopathy.    Obtain MRI of brain for further evaluation.         VTE Risk Mitigation (From admission, onward)           Ordered     IP VTE LOW RISK PATIENT  Once         08/07/23 2015     Place sequential compression device  Until discontinued         08/07/23 2015                               Kole Mahmood MD  Department of Hospital Medicine  [facility] - Telemetry Stepdown

## 2023-08-08 NOTE — ASSESSMENT & PLAN NOTE
Patients daughter reports she has much higher alcohol consumption history than previously documented.  Given report that patient acutely stopped drinking alcohol - she was at high risk for alcohol withdrawal and could also be high risk for developing Wernicke Encephalopathy.     Start treatment level thiamine for potential wernicke encephalopathy.    MRI of brain: No MR findings to suggest Wernicke's encephalopathy.  Short-term follow-up suggested.Generalized cerebral volume loss.Mild left mastoid air cell effusion

## 2023-08-08 NOTE — ASSESSMENT & PLAN NOTE
Lactic acid on admittion was 7.8, peaked at 10 and returned to 7.8 post lactulose. No acute hemodynamic changes.  -Blood Cx results pending   -continue empiric antibiotics   -Give 1L of LR bolus, place mario catheter for strict UOP monitoring   -Order Albuming 50gram IV q8hrs x 3 doses.   - serial lactates until resolution

## 2023-08-08 NOTE — ASSESSMENT & PLAN NOTE
- Hx of untreated HCV and alcoholic cirrhosis. No previous hx of HE,  Pt was A&O x3 on 8/5.    - GCS 9, requiring NRB to keep sats wnl   - recommend elevation of care to ICU  - encephalopathy likely mixed picture of decompensated cirrhosis and infection   - AST//34   - ALP 70   - Tbili 8.5   - Albumin 1.3   - PT/INR 23.8/2.4   - UA with many bacteria, +WBC +RBC    - Currently on rocephin    - Urine Cx pending   - Lactate uptrending since admission 3.4 -> 6.0 -> 10.3    - Received 1L LR bolus    - Recommend resuscitation with albumin   - Bcx pending   - Recommend broad spectrum Abx  - continue Wernicke protocol high dose thiamine  - titrate lactulose to 3-4 BM daily   - Ammonia 138 on admission  - diagnostic paracentesis 8/7: no evidence of SBP  - recommend therapeutic paracentesis    - will likely need albumin replacement.  - avoid sedating medications

## 2023-08-09 NOTE — PLAN OF CARE
[facility] - Surgical Intensive Care  Initial Discharge Assessment       Primary Care Provider: Jeramie Self MD    Admission Diagnosis: Spontaneous bacterial peritonitis [K65.2]  Hyperammonemia [E72.20]  Weakness [R53.1]  Chest pain [R07.9]  Ascites due to alcoholic cirrhosis [K70.31]  Hepatic cirrhosis, unspecified hepatic cirrhosis type, unspecified whether ascites present [K74.60]    Admission Date: 8/7/2023  Expected Discharge Date:          Payor: MEDICARE / Plan: MEDICARE PART A & B / Product Type: Government /     Extended Emergency Contact Information  Primary Emergency Contact: Fidel Basilio  Address: [address]           [place] [address] [address] [place] [facility]  Home Phone: [phone]  Relation: Spouse  Secondary Emergency Contact: DarlinKylee  Mobile Phone: [phone]  Relation: Daughter    Discharge Plan A: Comfort care/withdrawal  Discharge Plan B: Comfort care/withdrawal      [facility] - [place] [place] - [address]  [address]  [place] [address]  Phone: [phone] Fax: [phone]    [facility] [place] - [address] [address]  [address]  [place] [address]  Phone: [phone] Fax: [phone]    [facility]  [address]  [place] [address]  Phone: [phone] Fax: [phone]      Initial Assessment (most recent)       Adult Discharge Assessment - 08/09/23 1347          Discharge Assessment    Assessment Type Discharge Planning Assessment     Communicated DUSTIN with patient/caregiver Date not available/Unable to determine     Reason For Admission Hepatic encephalopathy     People in Home spouse     Do you expect to return to your current living situation? No     Do you have help at home or someone to help you manage your care at home? --   The patient will be going into a inpatient hospice facility upon d/c.    Do you have prescription coverage? Yes     Coverage MEDICARE - MEDICARE PART A &  B     Are you on dialysis? No     Do you take coumadin? No     Discharge Plan A Comfort care/withdrawal     Discharge Plan B Comfort care/withdrawal     DME Needed Upon Discharge  none     Discharge Plan discussed with: Adult children;Spouse/sig other        Physical Activity    On average, how many days per week do you engage in moderate to strenuous exercise (like a brisk walk)? Patient refused     On average, how many minutes do you engage in exercise at this level? Patient refused        Financial Resource Strain    How hard is it for you to pay for the very basics like food, housing, medical care, and heating? Patient refused        Housing Stability    In the last 12 months, was there a time when you were not able to pay the mortgage or rent on time? Patient refused     In the last 12 months, was there a time when you did not have a steady place to sleep or slept in a shelter (including now)? Patient refused        Transportation Needs    In the past 12 months, has lack of transportation kept you from medical appointments or from getting medications? Patient refused     In the past 12 months, has lack of transportation kept you from meetings, work, or from getting things needed for daily living? Patient refused        Food Insecurity    Within the past 12 months, you worried that your food would run out before you got the money to buy more. Patient refused     Within the past 12 months, the food you bought just didn't last and you didn't have money to get more. Patient refused        Stress    Do you feel stress - tense, restless, nervous, or anxious, or unable to sleep at night because your mind is troubled all the time - these days? Patient refused        Social Connections    In a typical week, how many times do you talk on the phone with family, friends, or neighbors? Patient refused     How often do you get together with friends or relatives? Patient refused     How often do you attend Anglican or Pentecostalism  services? Patient refused     Do you belong to any clubs or organizations such as Orthodox groups, unions, fraternal or athletic groups, or school groups? Patient refused     How often do you attend meetings of the clubs or organizations you belong to? Patient refused     Are you , , , , never , or living with a partner? Patient refused        Alcohol Use    Q1: How often do you have a drink containing alcohol? Patient refused     Q2: How many drinks containing alcohol do you have on a typical day when you are drinking? Patient refused     Q3: How often do you have six or more drinks on one occasion? Patient refused                   The SW met with patient and family at bedside. The patient's d/c plans are for inpatient hospice / comfort care. .       Rosa Chan LMSW  Case Management [facility]

## 2023-08-09 NOTE — PLAN OF CARE
[facility]  Department of Hospital Medicine  [address] [place] [address]  [phone] after hours  [phone] fax    HOSPICE  ORDERS    08/09/2023    Admit to Hospice:  Inpatient Service     Diagnoses:   Active Hospital Problems    Diagnosis  POA    *Hepatic encephalopathy [K76.82]  Yes    ACP (advance care planning) [Z71.89]  Not Applicable     Priority: 1 - High    Severe malnutrition [E43]  Unknown    Comfort measures only status [Z51.5]  Not Applicable    Decompensated cirrhosis related to hepatitis C virus (HCV) [B19.20, K74.69]  Yes    Other ascites [R18.8]  Yes    Lactic acidosis [E87.20]  Yes    Weakness [R53.1]  Yes    Alcohol dependence [F10.20]  Yes     Chronic    Anemia of chronic disease [D63.8]  Yes      Resolved Hospital Problems   No resolved problems to display.       Hospice Qualifying Diagnoses: End stage liver disease        Patient has a life expectancy < 6 months due to:  Primary Hospice Diagnosis End stage liver disease  Comorbid Conditions Contributing to Decline: EtOH abuse, HCV, Malnutrition    Vital Signs: Routine per Hospice Protocol.    Code Status: DNR/DNI    Allergies:   Review of patient's allergies indicates:   Allergen Reactions    Codeine Nausea And Vomiting       Diet: Adult regular as tolerated     Activities: As tolerated    Goals of Care Treatment Preferences:  Code Status: DNR          What is most important right now is to focus on Treatment of current decompensated state and if patient's condition does not improve and if she is not a transplant candidate, she is thinking most likely would like to shift towards comfort care measures and enrollment in home hospice services in order to keep her mother comfortable. .  Accordingly, we have decided that the best plan to meet the patient's goals includes continuing with treatment.      Nursing: Per Hospice Routine       Routine Skin for Bedridden Patients: Apply moisture barrier  cream to all skin folds and   wet areas in perineal area daily and after baths and all bowel movements.  Medications:        Medication List        STOP taking these medications      CYANOCOBALAMIN (VITAMIN B-12) ORAL     DULCOLAX (BISACODYL) ORAL     loratadine 10 mg tablet  Commonly known as: CLARITIN     ondansetron 4 MG tablet  Commonly known as: ZOFRAN     Pepto-BismoL 262 mg/15 mL suspension  Generic drug: bismuth subsalicylate     VISINE DRY EYE RELIEF OPHT              Report CBG < 60 or > 350 to physician.         Insulin Sliding Scale         Glucose  Novolog Insulin Subcutaneous        0 - 60   Orange juice or glucose tablet      No insulin   201-250  2 units   251-300  4 units   301-350  6 units   351-400  8 units   >400   10 units then call physician      Future Orders:  Hospice Medical Director may dictate new orders for comfortable care measures & sign death certificate.        _________________________________  Fernanda Gonzales MD PGY 1  08/09/2023

## 2023-08-09 NOTE — TREATMENT PLAN
Hepatology Treatment Plan    Syl Basilio is a 67 y.o. female admitted to hospital 8/7/2023 (Hospital Day: 3) due to Hepatic encephalopathy.     Interval History  Per chart review, family has decided to pursue comfort care measures only.    Objective  Temp:  [97.3 °F (36.3 °C)-98.3 °F (36.8 °C)] 97.3 °F (36.3 °C) (08/09 0300)  Pulse:  [] 90 (08/09 0749)  BP: (103-140)/(34-84) 105/64 (08/09 0700)  Resp:  [6-28] 17 (08/09 0749)  SpO2:  [89 %-100 %] 100 % (08/09 0749)    Laboratory    Lab Results   Component Value Date    WBC 8.23 08/09/2023    HGB 6.4 (L) 08/09/2023    HCT 19.4 (LL) 08/09/2023     (H) 08/09/2023    PLT 44 (L) 08/09/2023       Lab Results   Component Value Date     (L) 08/09/2023    K 3.1 (L) 08/09/2023    CL 97 08/09/2023    CO2 21 (L) 08/09/2023    BUN 23 08/09/2023    CREATININE 1.7 (H) 08/09/2023    CALCIUM 8.0 (L) 08/09/2023       Lab Results   Component Value Date    ALBUMIN 2.3 (L) 08/09/2023    ALT 21 08/09/2023    AST 60 (H) 08/09/2023    GGT 27 08/07/2023    ALKPHOS 43 (L) 08/09/2023    BILITOT 7.1 (H) 08/09/2023       Lab Results   Component Value Date    INR 2.6 (H) 08/08/2023    INR 2.4 (H) 08/07/2023    INR 2.4 (H) 08/05/2023       MELD 3.0: 35 at 8/9/2023  3:49 AM  MELD-Na: 33 at 8/9/2023  3:49 AM  Calculated from:  Serum Creatinine: 1.7 mg/dL at 8/9/2023  3:49 AM  Serum Sodium: 129 mmol/L at 8/9/2023  3:49 AM  Total Bilirubin: 7.1 mg/dL at 8/9/2023  3:49 AM  Serum Albumin: 2.3 g/dL at 8/9/2023  3:49 AM  INR(ratio): 2.6 at 8/8/2023  9:28 AM  Age at listing (hypothetical): 67 years  Sex: Female at 8/9/2023  3:49 AM      Assessment  67 year old female with HCV and alcoholic cirrhosis admitted with profound encephalopathy. Course complicated by rising lactic acid (10) and no improvement in mental status. Family has ultimately decided to pursue comfort care measures only.     Plan  - No further workup needed from a Hepatology standpoint. We will sign-off.    Thank you for involving us in the care of Syl Darlin Basilio. Please call with any additional concerns or questions.    Anaya Recinos MD  Gastroenterology Fellow  [facility]

## 2023-08-09 NOTE — ASSESSMENT & PLAN NOTE
Patient's labs reflect anemia of chronic disease.Transfusion of 2 units PRBC earlier this week and hgb is stable at this time. H&H is 7.8/24.    Plan:  -Patient is now DNR/DNI status and on hospice care.   -d/c daily labs and transfusion protocal

## 2023-08-09 NOTE — ASSESSMENT & PLAN NOTE
Patient's family and spouse have decided to pursue comfort care only.     Plan:   - Hospice medicine consulted  - Arainge transfer to inpatient hospice   - Cease treatment interventions

## 2023-08-09 NOTE — H&P
[facility] - Intensive Care ([address] Medicine  History & Physical    Patient Name: Syl Basilio  MRN: [id number]  Patient Class: IP- Hospice  Admission Date: (Not on file)  Attending Physician: Amara Quintanilla MD   Primary Care Provider: Jeramie Self MD         Patient information was obtained from past medical records.     Subjective:     Principal Problem:<principal problem not specified>    Chief Complaint: No chief complaint on file.       HPI: 66 y/o F with PMH of HCV and alcoholic cirrhosis, chronic anemia requiring transfusions, debility was admitted for worsening confusion and abdominal distention.  She was managed in ICU for decompensated liver disease hepatic encephalopathy.  She had no improvement with significant clinical decline and family eventually opted for DNR DNI, hospice care and patient was transitioned to inpatient hospice.      Past Medical History:   Diagnosis Date    Age-related osteoporosis without current pathological fracture 3/7/2023    Alcohol dependence 2023    Anemia     Chronic hepatitis C without hepatic coma 7/3/2023    Decompensated cirrhosis related to hepatitis C virus (HCV) 2023    Elevated liver enzymes     Hepatitis     hep C ab +    History of blood transfusion     in 80s    Other ascites 2023    Seasonal allergies     Symptomatic anemia 7/3/2023       Past Surgical History:   Procedure Laterality Date     SECTION      COLONOSCOPY N/A 2023    Procedure: COLONOSCOPY;  Surgeon: Analisa Bhandari MD;  Location: [facility];  Service: Endoscopy;  Laterality: N/A;    ESOPHAGOGASTRODUODENOSCOPY N/A 2023    Procedure: EGD (ESOPHAGOGASTRODUODENOSCOPY);  Surgeon: Analisa Bhandari MD;  Location: [facility];  Service: Endoscopy;  Laterality: N/A;    HYSTERECTOMY         Review of patient's allergies indicates:   Allergen Reactions    Codeine Nausea And Vomiting       Current Facility-Administered Medications on File  Prior to Encounter   Medication    dextrose 10% bolus 125 mL 125 mL    dextrose 10% bolus 250 mL 250 mL    glucagon (human recombinant) injection 1 mg    glycopyrrolate injection 0.1 mg    [COMPLETED] lactated ringers bolus 1,000 mL    LORazepam injection 0.5 mg    LORazepam injection 1 mg    morphine 100 mg (1 mg/mL) in NACL 100 mL infusion (TITRATING)    morphine IV bolus from bag/infusion 0.5 mg    ondansetron injection 8 mg    prochlorperazine injection Soln 5 mg    sodium chloride 0.9% flush 10 mL    [DISCONTINUED] famotidine tablet 20 mg    [DISCONTINUED] lactulose 20 gram/30 mL solution Soln 20 g    [DISCONTINUED] morphine IV bolus from bag/infusion 0.5 mg    [DISCONTINUED] naloxone 0.4 mg/mL injection 0.02 mg    [DISCONTINUED] phytonadione vitamin k (AQUA-MEPHYTON) 10 mg in dextrose 5 % (D5W) 50 mL IVPB    [DISCONTINUED] piperacillin-tazobactam (ZOSYN) 4.5 g in dextrose 5 % in water (D5W) 100 mL IVPB (MB+)    [DISCONTINUED] prochlorperazine injection Soln 5 mg    [DISCONTINUED] promethazine tablet 25 mg    [DISCONTINUED] rifAXIMin tablet 550 mg    [DISCONTINUED] thiamine (B-1) 250 mg in dextrose 5 % (D5W) 100 mL IVPB    [DISCONTINUED] thiamine (B-1) 500 mg in dextrose 5 % (D5W) 100 mL IVPB    [DISCONTINUED] vancomycin - pharmacy to dose     Current Outpatient Medications on File Prior to Encounter   Medication Sig    [DISCONTINUED] bismuth subsalicylate (PEPTO-BISMOL) 262 mg/15 mL suspension Take 30 mLs by mouth daily as needed for Indigestion.    [DISCONTINUED] CYANOCOBALAMIN, VITAMIN B-12, ORAL Take 1,000 mcg by mouth once daily.    [DISCONTINUED] DULCOLAX, BISACODYL, ORAL Take 1 tablet by mouth daily as needed (Constipation).    [DISCONTINUED] loratadine (CLARITIN) 10 mg tablet Take 10 mg by mouth daily as needed for Allergies.    [DISCONTINUED] ondansetron (ZOFRAN) 4 MG tablet Take 4 mg by mouth every 8 (eight) hours as needed.    [DISCONTINUED] polyethylene glycol 400  (VISINE DRY EYE RELIEF OPHT) Place 1 drop into both eyes daily as needed (Dry eye).     Family History       Problem Relation (Age of Onset)    Diabetes Mother, Maternal Grandmother, Daughter    Lung cancer Father          Tobacco Use    Smoking status: Never    Smokeless tobacco: Never   Substance and Sexual Activity    Alcohol use: Yes     Comment: 2 times per month 4 beers    Drug use: No    Sexual activity: Not on file     Review of Systems   Unable to perform ROS: Other     Objective:     Vital Signs (Most Recent):    Vital Signs (24h Range):  Temp:  [97.3 °F (36.3 °C)-98.4 °F (36.9 °C)] 98.2 °F (36.8 °C)  Pulse:  [] 123  Resp:  [6-27] 9  SpO2:  [89 %-100 %] 100 %  BP: (103-134)/(63-84) 125/68        There is no height or weight on file to calculate BMI.     Physical Exam  Constitutional:       General: She is in acute distress.      Appearance: She is ill-appearing.      Comments: Unresponsive female   HENT:      Right Ear: External ear normal.      Left Ear: External ear normal.      Nose: Nose normal.      Comments: NG tube  Eyes:      Comments: Small nonreactive pupils   Cardiovascular:      Rate and Rhythm: Tachycardia present.   Pulmonary:      Effort: Respiratory distress (mild) present.      Comments: No significant rales or rhonchi  Abdominal:      General: There is distension.   Skin:     General: Skin is warm.   Neurological:      Comments: GCS of 4                Significant Labs: All pertinent labs within the past 24 hours have been reviewed.      Assessment/Plan:     Comfort measures only status  We will focus on comfort care at this time.  Patient was previously on morphine drip, we will continue at this time.  We will discontinue NG tube.  We will leave Cabello catheter in for now.        VTE Risk Mitigation (From admission, onward)    None                     Manolo Subramanian MD  Department of Hospital Medicine  [facility] - Intensive Care ([place])

## 2023-08-09 NOTE — PLAN OF CARE
Spoke with patients daughters, spouce and siblings about DNR codes status. Pt's family endorsed that they were working on home hospice and would like comfort care measures to be added to the pt's current plan of care. Orders placed for comfort care.     Naina Nova,  PGY-2   Critical Care Medicine

## 2023-08-09 NOTE — ASSESSMENT & PLAN NOTE
Lactic acid on admittion was 7.8, peaked at 10 and returned to 7.8 post lactulose. No acute hemodynamic changes.  -Blood Cx results pending   -Empiric antibiotics d/c'd   -Serial labs d/c'd

## 2023-08-09 NOTE — ASSESSMENT & PLAN NOTE
A formal, in-person discussion on 08/08/2023 at 14:53 was had with both family and spouse of mrs. Syl Basilio regarding end of life care and code status. Consciences was made with both Mr. Hossein Basilio () and family that the patient, lacking capacity will be classified as DNR/DNI code status. Patient placed on comfort care only on 8/9.

## 2023-08-09 NOTE — PLAN OF CARE
SICU PLAN OF CARE NOTE    Dx: Hepatic encephalopathy    Goals of Care: Comfort Care    Vital Signs:   Temp:  [97.3 °F (36.3 °C)-97.9 °F (36.6 °C)]   Pulse:  []   Resp:  [8-27]   BP: (108-134)/(63-82)   SpO2:  [89 %-100 %]      Neuro: Unresponsive    Cardiac:     Respiratory: Nasal Cannula    Gtts: Morphine     Urine Output: Urinary Catheter 260 cc/shift    Diet: NPO    Skin: generalized skin intact. Weight shifting assistance provided.    Shift Events: Pt switched to comfort care per families request. VSS. GCS of 6. 4L nasal cannula. Morphine and lorazepam added to maintain comfort. BMS to be discontinued in AM. 1 BM overnight.

## 2023-08-09 NOTE — PLAN OF CARE
08/09/23 0061   Post-Acute Status   Post-Acute Authorization Hospice   Hospice Status Referrals Sent     The SW met with the patient and family at bedside to follow-up regarding the patient's d/c plans for home with hospice. The patient's daughter informed the SW that they were concerned about the transportation for the patient with going back home. The patient's daughter and father stated that their preference is inpatient hospice for this patient. The SW provided the family with the names of the inpatient's hospice facilities. The patient's family reported that they would like the patient to stay at [facility] if possible because she does not have that long.     The SW faxed a referral via [facility] to [facility] for inpatient hospice at [facility] bed.     The SW contacted with America with [facility] to follow-up regarding the patient's referral. America reported that she was going to meet with the patient's family at bedside and let the SW know if she was able to accept this patient.     3:08 PM  The SW was notified by [facility] with [facility] that the patient has been accepted.    The SW will continue to follow.     Rosa Chan LMSW  Case Management [facility]

## 2023-08-09 NOTE — ASSESSMENT & PLAN NOTE
History of cirrhosis and prominent acuities on admission. Diagnostic paracentesis was performed in the ED with the following results:     Component Ref Range & Units 1 d ago   Body Fluid Type  Ascites    Fluid Appearance  Clear    Fluid Color  Yellow    WBC, Body Fluid /cu mm 54    Comment: Reference ranges for body fluids not established.   Correlate clinically.    Segs, Fluid % 8    Lymphs, Fluid % 49    Monocytes/Macrophages, Fluid % 34    Mesothelial Cells, Fluid % 9      Component Ref Range & Units 1 d ago   Body Fluid Source, LDH  Ascites    LD, Fluid Not established U/L 147      Component Ref Range & Units 1 d ago   Body Fluid Source, Total Protein  Ascites    Body Fluid, Protein Not established g/dL 1.4    Comment: Reference intervals have not been established for body fluids.   Comparison of this result with the concentration in the blood,   serum,or plasma is recommended.   The reference interval for total protein in serum/plasma is   0-3 years......5.4-7.4 g/dL   4-6 years......5.9-8.2 g/dL   >7 years.......6.0-8.4 g/dL   Please interpret in context with the clinical   picture.      Component Ref Range & Units 1 d ago   Body Fluid Source, Albumin  Ascites    Body Fluid, Albumin See text g/dL <0.4    Comment: Reference intervals have not been established for body fluids.   Comparison of this result with the concentration in the blood,   serum,or plasma is recommended.   The reference interval for albumin in serum/plasma is   3.5-5.2 g/dL. Please interpret in context with the clinical   picture.        Plan:   - SBP ruled out  - F/U cytology labs on fluid  - Interventions withheld in light of hospice status

## 2023-08-09 NOTE — DISCHARGE SUMMARY
"[facility] - Surgical Intensive Care  Critical Care Medicine  Discharge Summary      Patient Name: Syl Basilio  MRN: [id number]  Admission Date: 8/7/2023  Hospital Length of Stay: 2 days  Discharge Date and Time:  08/09/2023 4:18 PM  Attending Physician: Manolo Subramanian MD   Discharging Provider: Janes Conteh MD  Primary Care Provider: Jeramie Self MD  Reason for Admission: encephalopathy    HPI:   66 y/o F with PMH of HCV and alcoholic cirrhosis, chronic anemia requiring transfusions, debility was admitted for worsening confusion and abd distention. Pt most recently seen [place] [facility] [place] 8[place]5/2023 for weakness and abd distention. Pt found to have hgb of 5, received 2u pRBC and d/c home after no evidence of active bleeding was identified. Since that time pt has had worsnening of her sx. Per pt daughter, pt has had significantly decreased PO intake associated with her confusion. Per daughter, pt has is still drinking approximately 12 beers daily and some liquor. In the ED pt had paracentesis, lactulose enema and rectal tube placed with dark stool noted. Pt has been HDS but slightly tachycardic in since admission, Spo2 >97% on RA.     Hepatology consulted for "grade 4 HE with decompensated cirrhosis (HCV/ETOH)". Pt severely altered and unable to answer questions. Per record review and daughter, pt has had increasing confusion for past few days. No previous hx of AMS. Does not take home lactulose. Ammonia 138 on admission. Pt has also had worsening abd distention and pain. During recent admission in early July, pt d/c on spironolactone 100mg qd and lasix 20mg qd. Unsure if she has been taking these medications. CTAP this admission demonstrates cirrhotic liver and sequela of portal htn, large volume ascites. Diagnostic paracentesis done yesterday, no evidence of SBP. Pt had EGD 4/2023, with no evidence of varices. Pt started on rocephin in ED. Pertinent results include AST//34, ALP 70, Tbili 8.5, Albumin 1.3, " PT/INR 23.8/2.4, lactate uptrending to 10.3. Stool in fecal management system appears melanic. Per record review, pt has hx of untreated chronic HCV first noted in 2015, most recently positive in 2/2023. MELD of 31.      * No surgery found *    Indwelling Lines/Drains at Time of Discharge:   Lines/Drains/Airways     Drain  Duration                NG/OG Tube 08/08/23 1230 Right nostril 1 day         Urethral Catheter 08/07/23 2043 Silicone 16 Fr. 1 day              Hospital Course:   The patient was admitted to the MICU serves on August 8th for profound hepatic encephalopathy and a GCS of 6-7. Pertinent labs: Ammonia 138, AST//34, ALP 70, Tbili 8.5, Albumin 1.3, PT/INR 23.8/2.4, UA with many bacteria, +WBC +RBC,  Lactate 10.3 (down trending, now 7.8). given 2 units PRBC earlier this week and hgb is stable at this time. H&H is 7.8/24. Currently on rocephin and lactulose via NG tube delivery. Hepatology following. Spoke with family and spouse regarding code status, both came to consensus to change her to DNR/DNI.  On 8/9, Patient's family decided to pursue comfort care inpatient. Resuscitative efforts ceased and palliative measures continued. Patient opted for inpatient hospice and patient will go with [facility].       Consults (From admission, onward)        Status Ordering Provider     Case Management/Social Work  Once        Provider:  (Not yet assigned)    Acknowledged WILLOW TREJO     Inpatient consult to Critical Care Medicine  Once        Provider:  (Not yet assigned)    Completed JOSEPH STEWART     Inpatient consult to Hepatology  Once        Provider:  (Not yet assigned)    Completed SANDOVAL CAPPS     Inpatient consult to Registered Dietitian/Nutritionist  Once        Provider:  (Not yet assigned)    Completed JOSEPH STEWART        Significant Labs:  All pertinent labs within the past 24 hours have been reviewed.    Significant Imaging:  I have reviewed all pertinent imaging results/findings  within the past 24 hours.    Pending Diagnostic Studies:     Procedure Component Value Units Date/Time    MAGDALENA Screen w/Reflex [[id number]] Collected: 08/08/23 0928    Order Status: Sent Lab Status: In process Updated: 08/08/23 0945    Specimen: Blood     Narrative:      Collection has been rescheduled by VS1 at 08/08/2023 05:20 Reason:   Unable to collect Nurse Chastity notified  Collection has been rescheduled by KR3 at 08/08/2023 05:45 Reason:   Patient is very deydrated, notified BOBY Cali    Anti-Liver, Kidney, Microsome Ab [[id number]] Collected: 08/08/23 0928    Order Status: Sent Lab Status: In process Updated: 08/08/23 0945    Specimen: Blood     Narrative:      Collection has been rescheduled by VS1 at 08/08/2023 05:20 Reason:   Unable to collect Nurse Chastity notified  Collection has been rescheduled by KR3 at 08/08/2023 05:45 Reason:   Patient is very deydrated, notified BOBY Cali    Anti-Smooth Muscle Antibody [[id number]] Collected: 08/08/23 0928    Order Status: Sent Lab Status: In process Updated: 08/08/23 0945    Specimen: Blood     Narrative:      Collection has been rescheduled by VS1 at 08/08/2023 05:20 Reason:   Unable to collect Nurse Chastity notified  Collection has been rescheduled by KR3 at 08/08/2023 05:45 Reason:   Patient is very deydrated, notified BOBY Cali    IR Paracentesis with Imaging [[id number]]     Order Status: Sent Lab Status: No result     Phosphatidylethanol (PETH) [[id number]] Collected: 08/08/23 0928    Order Status: Sent Lab Status: In process Updated: 08/08/23 0945    Specimen: Blood     Narrative:      Collection has been rescheduled by VS1 at 08/08/2023 05:20 Reason:   Unable to collect Nurse Chastity notified  Collection has been rescheduled by KR3 at 08/08/2023 05:45 Reason:   Patient is very deydrated, notified BOBY Cali        Final Active Diagnoses:    Diagnosis Date Noted POA    PRINCIPAL PROBLEM:  Hepatic encephalopathy [K76.82] 08/07/2023 Yes    Severe  malnutrition [E43] 08/08/2023 Unknown    ACP (advance care planning) [Z71.89] 08/08/2023 Not Applicable    Comfort measures only status [Z51.5] 08/08/2023 Not Applicable    Decompensated cirrhosis related to hepatitis C virus (HCV) [B19.20, K74.69] 08/07/2023 Yes    Other ascites [R18.8] 08/07/2023 Yes    Lactic acidosis [E87.20] 08/07/2023 Yes    Weakness [R53.1] 08/03/2023 Yes    Alcohol dependence [F10.20] 02/07/2023 Yes     Chronic    Anemia of chronic disease [D63.8] 10/22/2014 Yes      Problems Resolved During this Admission:     No new Assessment & Plan notes have been filed under this hospital service since the last note was generated.  Service: Critical Care Medicine    Discharged Condition: stable    Disposition:       Patient Instructions:   No discharge procedures on file.  Medications:  Transfer Medications (for Discharge Readmit only):   Current Facility-Administered Medications   Medication Dose Route Frequency Provider Last Rate Last Admin    dextrose 10% bolus 125 mL 125 mL  12.5 g Intravenous PRN Janes Conteh MD        dextrose 10% bolus 250 mL 250 mL  25 g Intravenous PRN Janes Conteh MD        glucagon (human recombinant) injection 1 mg  1 mg Intramuscular PRN Kole Mahmood MD        glycopyrrolate injection 0.1 mg  0.1 mg Intravenous TID PRN Naina Nova MD   0.1 mg at 08/09/23 1525    LORazepam injection 0.5 mg  0.5 mg Intravenous Q30 Min PRN Naina Nova MD   0.5 mg at 08/08/23 2247    LORazepam injection 1 mg  1 mg Intravenous Q4H PRN Naina Nova MD   1 mg at 08/09/23 1525    morphine 100 mg (1 mg/mL) in NACL 100 mL infusion (TITRATING)  0-10 mg/hr Intravenous Continuous Naina Nova MD 2 mL/hr at 08/09/23 1400 2 mg/hr at 08/09/23 1400    morphine IV bolus from bag/infusion 0.5 mg  0.5 mg Intravenous Q30 Min PRN Naina Nova MD        ondansetron injection 8 mg  8 mg Intravenous Q8H PRN Naina Nova MD        prochlorperazine injection  Soln 5 mg  5 mg Intravenous Q6H PRN Naina Nova MD        sodium chloride 0.9% flush 10 mL  10 mL Intravenous PRN Eric Boyd DO Koshy George, MD  Critical Care Medicine  [facility] - Surgical Intensive Care

## 2023-08-09 NOTE — SUBJECTIVE & OBJECTIVE
Review of Systems   Reason unable to perform ROS: altered mental status.       Past Medical History:   Diagnosis Date    Age-related osteoporosis without current pathological fracture 3/7/2023    Alcohol dependence 2023    Anemia     Chronic hepatitis C without hepatic coma 7/3/2023    Decompensated cirrhosis related to hepatitis C virus (HCV) 2023    Elevated liver enzymes     Hepatitis     hep C ab +    History of blood transfusion     in 80s    Other ascites 2023    Seasonal allergies     Symptomatic anemia 7/3/2023       Past Surgical History:   Procedure Laterality Date     SECTION      COLONOSCOPY N/A 2023    Procedure: COLONOSCOPY;  Surgeon: Analisa Bhandari MD;  Location: [facility];  Service: Endoscopy;  Laterality: N/A;    ESOPHAGOGASTRODUODENOSCOPY N/A 2023    Procedure: EGD (ESOPHAGOGASTRODUODENOSCOPY);  Surgeon: Analisa Bhandari MD;  Location: [facility];  Service: Endoscopy;  Laterality: N/A;    HYSTERECTOMY             Review of patient's allergies indicates:   Allergen Reactions    Codeine Nausea And Vomiting         Tobacco Use    Smoking status: Never    Smokeless tobacco: Never   Substance and Sexual Activity    Alcohol use: Yes     Comment: 2 times per month 4 beers    Drug use: No    Sexual activity: Not on file       Medications Prior to Admission   Medication Sig Dispense Refill Last Dose    [DISCONTINUED] bismuth subsalicylate (PEPTO-BISMOL) 262 mg/15 mL suspension Take 30 mLs by mouth daily as needed for Indigestion.       [DISCONTINUED] CYANOCOBALAMIN, VITAMIN B-12, ORAL Take 1,000 mcg by mouth once daily.       [DISCONTINUED] DULCOLAX, BISACODYL, ORAL Take 1 tablet by mouth daily as needed (Constipation).       [DISCONTINUED] loratadine (CLARITIN) 10 mg tablet Take 10 mg by mouth daily as needed for Allergies.       [DISCONTINUED] ondansetron (ZOFRAN) 4 MG tablet Take 4 mg by mouth every 8 (eight) hours as needed.       [DISCONTINUED] polyethylene glycol 400  (VISINE DRY EYE RELIEF OPHT) Place 1 drop into both eyes daily as needed (Dry eye).          Objective:     Vital Signs (Most Recent):  Temp: 98.4 °F (36.9 °C) (08/09/23 0700)  Pulse: 105 (08/09/23 1215)  Resp: 15 (08/09/23 1215)  BP: 125/68 (08/09/23 1200)  SpO2: 98 % (08/09/23 1215) Vital Signs (24h Range):  Temp:  [97.3 °F (36.3 °C)-98.4 °F (36.9 °C)] 98.4 °F (36.9 °C)  Pulse:  [] 105  Resp:  [6-27] 15  SpO2:  [89 %-100 %] 98 %  BP: (103-134)/(63-84) 125/68     Weight: 49.7 kg (109 lb 9.1 oz) (08/08/23 1043)  Body mass index is 19.41 kg/m².       Physical Exam  Vitals reviewed.   Constitutional:       Appearance: She is ill-appearing.   HENT:      Head: Normocephalic and atraumatic.      Mouth/Throat:      Mouth: Mucous membranes are dry.   Eyes:      General: Scleral icterus present.      Pupils: Pupils are equal, round, and reactive to light.   Cardiovascular:      Rate and Rhythm: Regular rhythm. Tachycardia present.   Pulmonary:      Breath sounds: Normal breath sounds.   Abdominal:      General: There is distension.      Tenderness: There is abdominal tenderness.   Musculoskeletal:         General: No signs of injury.      Right lower leg: No edema.      Left lower leg: No edema.   Skin:     Coloration: Skin is jaundiced.   Neurological:      Mental Status: She is lethargic and confused.      GCS: GCS eye subscore is 4. GCS verbal subscore is 2. GCS motor subscore is 5.            MELD 3.0: 35 at 8/9/2023  3:49 AM  MELD-Na: 33 at 8/9/2023  3:49 AM  Calculated from:  Serum Creatinine: 1.7 mg/dL at 8/9/2023  3:49 AM  Serum Sodium: 129 mmol/L at 8/9/2023  3:49 AM  Total Bilirubin: 7.1 mg/dL at 8/9/2023  3:49 AM  Serum Albumin: 2.3 g/dL at 8/9/2023  3:49 AM  INR(ratio): 2.6 at 8/8/2023  9:28 AM  Age at listing (hypothetical): 67 years  Sex: Female at 8/9/2023  3:49 AM      Significant Labs:  CBC:   Recent Labs   Lab 08/09/23  0513   WBC 8.23   RBC 1.94*   HGB 6.4*   HCT 19.4*   PLT 44*       CMP:   Recent  Labs   Lab 08/09/23  0349   GLU 95   CALCIUM 8.0*   ALBUMIN 2.3*   PROT 5.6*   *   K 3.1*   CO2 21*   CL 97   BUN 23   CREATININE 1.7*   ALKPHOS 43*   ALT 21   AST 60*   BILITOT 7.1*       Coagulation:   Recent Labs   Lab 08/07/23  1252 08/08/23  0928   INR 2.4* 2.6*   APTT 42.7*  --          Significant Imaging:  CT: Reviewed  CXR: appropriate placement and positioning of NG tube.

## 2023-08-09 NOTE — ASSESSMENT & PLAN NOTE
We will focus on comfort care at this time.  Patient was previously on morphine drip, we will continue at this time.  We will discontinue NG tube.  We will leave Cabello catheter in for now.

## 2023-08-09 NOTE — SUBJECTIVE & OBJECTIVE
Past Medical History:   Diagnosis Date    Age-related osteoporosis without current pathological fracture 3/7/2023    Alcohol dependence 2023    Anemia     Chronic hepatitis C without hepatic coma 7/3/2023    Decompensated cirrhosis related to hepatitis C virus (HCV) 2023    Elevated liver enzymes     Hepatitis     hep C ab +    History of blood transfusion     in 80s    Other ascites 2023    Seasonal allergies     Symptomatic anemia 7/3/2023       Past Surgical History:   Procedure Laterality Date     SECTION      COLONOSCOPY N/A 2023    Procedure: COLONOSCOPY;  Surgeon: Analisa Bhandari MD;  Location: [facility];  Service: Endoscopy;  Laterality: N/A;    ESOPHAGOGASTRODUODENOSCOPY N/A 2023    Procedure: EGD (ESOPHAGOGASTRODUODENOSCOPY);  Surgeon: Analisa Bhandari MD;  Location: [facility];  Service: Endoscopy;  Laterality: N/A;    HYSTERECTOMY         Review of patient's allergies indicates:   Allergen Reactions    Codeine Nausea And Vomiting       Current Facility-Administered Medications on File Prior to Encounter   Medication    dextrose 10% bolus 125 mL 125 mL    dextrose 10% bolus 250 mL 250 mL    glucagon (human recombinant) injection 1 mg    glycopyrrolate injection 0.1 mg    [COMPLETED] lactated ringers bolus 1,000 mL    LORazepam injection 0.5 mg    LORazepam injection 1 mg    morphine 100 mg (1 mg/mL) in NACL 100 mL infusion (TITRATING)    morphine IV bolus from bag/infusion 0.5 mg    ondansetron injection 8 mg    prochlorperazine injection Soln 5 mg    sodium chloride 0.9% flush 10 mL    [DISCONTINUED] famotidine tablet 20 mg    [DISCONTINUED] lactulose 20 gram/30 mL solution Soln 20 g    [DISCONTINUED] morphine IV bolus from bag/infusion 0.5 mg    [DISCONTINUED] naloxone 0.4 mg/mL injection 0.02 mg    [DISCONTINUED] phytonadione vitamin k (AQUA-MEPHYTON) 10 mg in dextrose 5 % (D5W) 50 mL IVPB    [DISCONTINUED] piperacillin-tazobactam (ZOSYN) 4.5 g in dextrose 5 % in water (D5W)  100 mL IVPB (MB+)    [DISCONTINUED] prochlorperazine injection Soln 5 mg    [DISCONTINUED] promethazine tablet 25 mg    [DISCONTINUED] rifAXIMin tablet 550 mg    [DISCONTINUED] thiamine (B-1) 250 mg in dextrose 5 % (D5W) 100 mL IVPB    [DISCONTINUED] thiamine (B-1) 500 mg in dextrose 5 % (D5W) 100 mL IVPB    [DISCONTINUED] vancomycin - pharmacy to dose     Current Outpatient Medications on File Prior to Encounter   Medication Sig    [DISCONTINUED] bismuth subsalicylate (PEPTO-BISMOL) 262 mg/15 mL suspension Take 30 mLs by mouth daily as needed for Indigestion.    [DISCONTINUED] CYANOCOBALAMIN, VITAMIN B-12, ORAL Take 1,000 mcg by mouth once daily.    [DISCONTINUED] DULCOLAX, BISACODYL, ORAL Take 1 tablet by mouth daily as needed (Constipation).    [DISCONTINUED] loratadine (CLARITIN) 10 mg tablet Take 10 mg by mouth daily as needed for Allergies.    [DISCONTINUED] ondansetron (ZOFRAN) 4 MG tablet Take 4 mg by mouth every 8 (eight) hours as needed.    [DISCONTINUED] polyethylene glycol 400 (VISINE DRY EYE RELIEF OPHT) Place 1 drop into both eyes daily as needed (Dry eye).     Family History       Problem Relation (Age of Onset)    Diabetes Mother, Maternal Grandmother, Daughter    Lung cancer Father          Tobacco Use    Smoking status: Never    Smokeless tobacco: Never   Substance and Sexual Activity    Alcohol use: Yes     Comment: 2 times per month 4 beers    Drug use: No    Sexual activity: Not on file     Review of Systems   Unable to perform ROS: Other     Objective:     Vital Signs (Most Recent):    Vital Signs (24h Range):  Temp:  [97.3 °F (36.3 °C)-98.4 °F (36.9 °C)] 98.2 °F (36.8 °C)  Pulse:  [] 123  Resp:  [6-27] 9  SpO2:  [89 %-100 %] 100 %  BP: (103-134)/(63-84) 125/68        There is no height or weight on file to calculate BMI.     Physical Exam  Constitutional:       General: She is in acute distress.      Appearance: She is ill-appearing.      Comments: Unresponsive female   HENT:      Right  Ear: External ear normal.      Left Ear: External ear normal.      Nose: Nose normal.      Comments: NG tube  Eyes:      Comments: Small nonreactive pupils   Cardiovascular:      Rate and Rhythm: Tachycardia present.   Pulmonary:      Effort: Respiratory distress (mild) present.      Comments: No significant rales or rhonchi  Abdominal:      General: There is distension.   Skin:     General: Skin is warm.   Neurological:      Comments: GCS of 4                Significant Labs: All pertinent labs within the past 24 hours have been reviewed.

## 2023-08-09 NOTE — ASSESSMENT & PLAN NOTE
- Hx of untreated HCV and alcoholic cirrhosis. No previous hx of HE,  Pt was A&O x3 and GCS 9, requiring NRB to keep sats wnl on 8/5.  Currently her mentation and alertness has drastically declined. Current GCS of 6-7 on 8/8. Encephalopathy likely mixed picture of decompensated cirrhosis and possible infection. Peritoneal fluid labs did not demonstrate an infectious contribution to etiology.  On admission; Ammonia 138, AST//34, ALP 70, Tbili 8.5, Albumin 1.3, PT/INR 23.8/2.4, UA with many bacteria, +WBC +RBC,  Lactate 10.3 (down trending, now 7.8),      Plan:  - Rocephin d/c'd  - Urine and blood Cx pending  - EtOH withdraw still of concern  - Interventions discontinued given hospice care

## 2023-08-09 NOTE — HOSPITAL COURSE
The patient was admitted to the MICU serves on August 8th for profound hepatic encephalopathy and a GCS of 6-7. Pertinent labs: Ammonia 138, AST//34, ALP 70, Tbili 8.5, Albumin 1.3, PT/INR 23.8/2.4, UA with many bacteria, +WBC +RBC,  Lactate 10.3 (down trending, now 7.8). given 2 units PRBC earlier this week and hgb is stable at this time. H&H is 7.8/24. Currently on rocephin and lactulose via NG tube delivery. Hepatology following. Spoke with family and spouse regarding code status, both came to consensus to change her to DNR/DNI.  On 8/9, Patient's family decided to pursue comfort care inpatient. Resuscitative efforts ceased and palliative measures continued. Patient opted for inpatient hospice and patient will go with [facility].

## 2023-08-09 NOTE — ASSESSMENT & PLAN NOTE
Previously documented reports she has much higher alcohol consumption history than previously documented.  Given report that patient acutely stopped drinking alcohol - she was at high risk for alcohol withdrawal and could also be high risk for developing Wernicke Encephalopathy. MRI of brain: No MR findings to suggest Wernicke's encephalopathy.  Short-term follow-up suggested. Generalized cerebral volume loss. Mild left mastoid air cell effusion

## 2023-08-09 NOTE — PROGRESS NOTES
"[facility]  Critical Care Medicine  Progress Note    Patient Name: Syl Basilio  MRN: [id number]  Admission Date: 8/7/2023  Hospital Length of Stay: 2 days  Code Status: DNR  Attending Provider: Moe Carmona*  Primary Care Provider: Jeramie Self MD   Principal Problem: Hepatic encephalopathy    Subjective:     HPI:  68 y/o F with PMH of HCV and alcoholic cirrhosis, chronic anemia requiring transfusions, debility was admitted for worsening confusion and abd distention. Pt most recently seen [place] [facility] [place]/2023 for weakness and abd distention. Pt found to have hgb of 5, received 2u pRBC and d/c home after no evidence of active bleeding was identified. Since that time pt has had worsnening of her sx. Per pt daughter, pt has had significantly decreased PO intake associated with her confusion. Per daughter, pt has is still drinking approximately 12 beers daily and some liquor. In the ED pt had paracentesis, lactulose enema and rectal tube placed with dark stool noted. Pt has been HDS but slightly tachycardic in since admission, Spo2 >97% on RA.     Hepatology consulted for "grade 4 HE with decompensated cirrhosis (HCV/ETOH)". Pt severely altered and unable to answer questions. Per record review and daughter, pt has had increasing confusion for past few days. No previous hx of AMS. Does not take home lactulose. Ammonia 138 on admission. Pt has also had worsening abd distention and pain. During recent admission in early July, pt d/c on spironolactone 100mg qd and lasix 20mg qd. Unsure if she has been taking these medications. CTAP this admission demonstrates cirrhotic liver and sequela of portal htn, large volume ascites. Diagnostic paracentesis done yesterday, no evidence of SBP. Pt had EGD 4/2023, with no evidence of varices. Pt started on rocephin in ED. Pertinent results include AST//34, ALP 70, Tbili 8.5, Albumin 1.3, PT/INR 23.8/2.4, lactate uptrending to " 10.3. Stool in fecal management system appears melanic. Per record review, pt has hx of untreated chronic HCV first noted in , most recently positive in 2023. MELD of 31.      Hospital/ICU Course:  The patient was admitted to the MICU serves on  for profound hepatic encephalopathy and a GCS of 6-7. Pertinent labs: Ammonia 138, AST//34, ALP 70, Tbili 8.5, Albumin 1.3, PT/INR 23.8/2.4, UA with many bacteria, +WBC +RBC,  Lactate 10.3 (down trending, now 7.8). given 2 units PRBC earlier this week and hgb is stable at this time. H&H is 7.8/24. Currently on rocephin and lactulose via NG tube delivery. Hepatology following. Spoke with family and spouse regarding code status, both came to consensus to change her to DNR/DNI.  On , Patient's family decided to pursue comfort care inpatient. Resuscitative efforts ceased and palliative measures continued.       Review of Systems   Reason unable to perform ROS: altered mental status.       Past Medical History:   Diagnosis Date    Age-related osteoporosis without current pathological fracture 3/7/2023    Alcohol dependence 2023    Anemia     Chronic hepatitis C without hepatic coma 7/3/2023    Decompensated cirrhosis related to hepatitis C virus (HCV) 2023    Elevated liver enzymes     Hepatitis     hep C ab +    History of blood transfusion     in 80s    Other ascites 2023    Seasonal allergies     Symptomatic anemia 7/3/2023       Past Surgical History:   Procedure Laterality Date     SECTION      COLONOSCOPY N/A 2023    Procedure: COLONOSCOPY;  Surgeon: Analisa Bhandari MD;  Location: [facility];  Service: Endoscopy;  Laterality: N/A;    ESOPHAGOGASTRODUODENOSCOPY N/A 2023    Procedure: EGD (ESOPHAGOGASTRODUODENOSCOPY);  Surgeon: Analisa Bhandari MD;  Location: [facility];  Service: Endoscopy;  Laterality: N/A;    HYSTERECTOMY             Review of patient's allergies indicates:   Allergen Reactions    Codeine  Nausea And Vomiting         Tobacco Use    Smoking status: Never    Smokeless tobacco: Never   Substance and Sexual Activity    Alcohol use: Yes     Comment: 2 times per month 4 beers    Drug use: No    Sexual activity: Not on file       Medications Prior to Admission   Medication Sig Dispense Refill Last Dose    [DISCONTINUED] bismuth subsalicylate (PEPTO-BISMOL) 262 mg/15 mL suspension Take 30 mLs by mouth daily as needed for Indigestion.       [DISCONTINUED] CYANOCOBALAMIN, VITAMIN B-12, ORAL Take 1,000 mcg by mouth once daily.       [DISCONTINUED] DULCOLAX, BISACODYL, ORAL Take 1 tablet by mouth daily as needed (Constipation).       [DISCONTINUED] loratadine (CLARITIN) 10 mg tablet Take 10 mg by mouth daily as needed for Allergies.       [DISCONTINUED] ondansetron (ZOFRAN) 4 MG tablet Take 4 mg by mouth every 8 (eight) hours as needed.       [DISCONTINUED] polyethylene glycol 400 (VISINE DRY EYE RELIEF OPHT) Place 1 drop into both eyes daily as needed (Dry eye).          Objective:     Vital Signs (Most Recent):  Temp: 98.4 °F (36.9 °C) (08/09/23 0700)  Pulse: 105 (08/09/23 1215)  Resp: 15 (08/09/23 1215)  BP: 125/68 (08/09/23 1200)  SpO2: 98 % (08/09/23 1215) Vital Signs (24h Range):  Temp:  [97.3 °F (36.3 °C)-98.4 °F (36.9 °C)] 98.4 °F (36.9 °C)  Pulse:  [] 105  Resp:  [6-27] 15  SpO2:  [89 %-100 %] 98 %  BP: (103-134)/(63-84) 125/68     Weight: 49.7 kg (109 lb 9.1 oz) (08/08/23 1043)  Body mass index is 19.41 kg/m².      Physical Exam  Vitals reviewed.   Constitutional:       Appearance: She is ill-appearing.   HENT:      Head: Normocephalic and atraumatic.      Mouth/Throat:      Mouth: Mucous membranes are dry.   Eyes:      General: Scleral icterus present.      Pupils: Pupils are equal, round, and reactive to light.   Cardiovascular:      Rate and Rhythm: Regular rhythm. Tachycardia present.   Pulmonary:      Breath sounds: Normal breath sounds.   Abdominal:      General: There is  distension.      Tenderness: There is abdominal tenderness.   Musculoskeletal:         General: No signs of injury.      Right lower leg: No edema.      Left lower leg: No edema.   Skin:     Coloration: Skin is jaundiced.   Neurological:      Mental Status: She is lethargic and confused.      GCS: GCS eye subscore is 4. GCS verbal subscore is 2. GCS motor subscore is 5.            MELD 3.0: 35 at 8/9/2023  3:49 AM  MELD-Na: 33 at 8/9/2023  3:49 AM  Calculated from:  Serum Creatinine: 1.7 mg/dL at 8/9/2023  3:49 AM  Serum Sodium: 129 mmol/L at 8/9/2023  3:49 AM  Total Bilirubin: 7.1 mg/dL at 8/9/2023  3:49 AM  Serum Albumin: 2.3 g/dL at 8/9/2023  3:49 AM  INR(ratio): 2.6 at 8/8/2023  9:28 AM  Age at listing (hypothetical): 67 years  Sex: Female at 8/9/2023  3:49 AM      Significant Labs:  CBC:   Recent Labs   Lab 08/09/23  0513   WBC 8.23   RBC 1.94*   HGB 6.4*   HCT 19.4*   PLT 44*       CMP:   Recent Labs   Lab 08/09/23  0349   GLU 95   CALCIUM 8.0*   ALBUMIN 2.3*   PROT 5.6*   *   K 3.1*   CO2 21*   CL 97   BUN 23   CREATININE 1.7*   ALKPHOS 43*   ALT 21   AST 60*   BILITOT 7.1*       Coagulation:   Recent Labs   Lab 08/07/23  1252 08/08/23  0928   INR 2.4* 2.6*   APTT 42.7*  --          Significant Imaging:  CT: Reviewed  CXR: appropriate placement and positioning of NG tube.      ABG  Recent Labs   Lab 08/07/23  1310 08/08/23  1655   PH 7.437  --    PO2 27* 30*  30*   PCO2 40.8  --    HCO3 27.5  --    BE 3  --      Assessment/Plan:     Psychiatric  Alcohol dependence  Previously documented reports she has much higher alcohol consumption history than previously documented.  Given report that patient acutely stopped drinking alcohol - she was at high risk for alcohol withdrawal and could also be high risk for developing Wernicke Encephalopathy. MRI of brain: No MR findings to suggest Wernicke's encephalopathy.  Short-term follow-up suggested. Generalized cerebral volume loss. Mild left mastoid air cell  effusion      Renal/  Lactic acidosis  Lactic acid on admittion was 7.8, peaked at 10 and returned to 7.8 post lactulose. No acute hemodynamic changes.  -Blood Cx results pending   -Empiric antibiotics d/c'd   -Serial labs d/c'd    Oncology  Anemia of chronic disease  Patient's labs reflect anemia of chronic disease.Transfusion of 2 units PRBC earlier this week and hgb is stable at this time. H&H is 7.8/24.    Plan:  -Patient is now DNR/DNI status and on hospice care.   -d/c daily labs and transfusion protocal    Endocrine  Severe malnutrition  Nutrition consulted. Most recent weight and BMI monitored-     Measurements:  Wt Readings from Last 1 Encounters:   08/08/23 49.7 kg (109 lb 9.1 oz)   Body mass index is 19.41 kg/m².    Patient has been screened and assessed by RD.    Malnutrition Type:  Context: social/environmental circumstances  Level: severe    Malnutrition Characteristic Summary:  Weight Loss (Malnutrition): greater than 7.5% in 3 months  Energy Intake (Malnutrition): less than or equal to 50% for greater than or equal to 1 month  Subcutaneous Fat (Malnutrition): severe depletion  Muscle Mass (Malnutrition): severe depletion    Recommendations     1. Diet advancement per SLP. Rec'd Low Na diet + Boost Breeze ONS.  2. If unable to advance diet, place NGT & initiate TFs. Rec'd Isosource 1.5 @ 45 mL/hr = 1620 kcals, 73 g of protein, 825 mL fluid.  - Please advance rate slowly & monitor for re-feeding syndrome.    3. RD to monitor & follow-up.     Goals: Meet % EEN, EPN by RD f/u date  Nutrition Goal Status: new  Communication of RD Recs: other (comment) (POC)    GI  * Hepatic encephalopathy  - Hx of untreated HCV and alcoholic cirrhosis. No previous hx of HE,  Pt was A&O x3 and GCS 9, requiring NRB to keep sats wnl on 8/5.  Currently her mentation and alertness has drastically declined. Current GCS of 6-7 on 8/8. Encephalopathy likely mixed picture of decompensated cirrhosis and possible infection.  Peritoneal fluid labs did not demonstrate an infectious contribution to etiology.  On admission; Ammonia 138, AST//34, ALP 70, Tbili 8.5, Albumin 1.3, PT/INR 23.8/2.4, UA with many bacteria, +WBC +RBC,  Lactate 10.3 (down trending, now 7.8),      Plan:  - Rocephin d/c'd  - Urine and blood Cx pending  - EtOH withdraw still of concern  - Interventions discontinued given hospice care    Other ascites  History of cirrhosis and prominent acuities on admission. Diagnostic paracentesis was performed in the ED with the following results:     Component Ref Range & Units 1 d ago   Body Fluid Type  Ascites    Fluid Appearance  Clear    Fluid Color  Yellow    WBC, Body Fluid /cu mm 54    Comment: Reference ranges for body fluids not established.   Correlate clinically.    Segs, Fluid % 8    Lymphs, Fluid % 49    Monocytes/Macrophages, Fluid % 34    Mesothelial Cells, Fluid % 9      Component Ref Range & Units 1 d ago   Body Fluid Source, LDH  Ascites    LD, Fluid Not established U/L 147      Component Ref Range & Units 1 d ago   Body Fluid Source, Total Protein  Ascites    Body Fluid, Protein Not established g/dL 1.4    Comment: Reference intervals have not been established for body fluids.   Comparison of this result with the concentration in the blood,   serum,or plasma is recommended.   The reference interval for total protein in serum/plasma is   0-3 years......5.4-7.4 g/dL   4-6 years......5.9-8.2 g/dL   >7 years.......6.0-8.4 g/dL   Please interpret in context with the clinical   picture.      Component Ref Range & Units 1 d ago   Body Fluid Source, Albumin  Ascites    Body Fluid, Albumin See text g/dL <0.4    Comment: Reference intervals have not been established for body fluids.   Comparison of this result with the concentration in the blood,   serum,or plasma is recommended.   The reference interval for albumin in serum/plasma is   3.5-5.2 g/dL. Please interpret in context with the clinical   picture.   "      Plan:   - SBP ruled out  - F/U cytology labs on fluid  - Interventions withheld in light of hospice status        Decompensated cirrhosis related to hepatitis C virus (HCV)  See "hepatic encephalopathy"    Palliative Care  Comfort measures only status  Patient's family and spouse have decided to pursue comfort care only.     Plan:   - Hospice medicine consulted  - [place] transfer to inpatient hospice   - Cease treatment interventions     ACP (advance care planning)  A formal, in-person discussion on 08/08/2023 at 14:53 was had with both family and spouse of mrs. Syl Basilio regarding end of life care and code status. Consciences was made with both Mr. Hossein Basilio () and family that the patient, lacking capacity will be classified as DNR/DNI code status. Patient placed on comfort care only on 8/9.     Other  Weakness  The patient is somulent throughout the day and nonresponsive to verbal que's. GCS on exam is 6-7.  Muscular tone is diminished and appearance cachectic. Likely due to HE of malnutrition.     Plan:   - consult nutrition for dietary needs       Critical Care Daily Checklist:    A: Awake: RASS Goal/Actual Goal:    Actual:     B: Spontaneous Breathing Trial Performed?     C: SAT & SBT Coordinated?  n/a                      D: Delirium: CAM-ICU Overall CAM-ICU: Positive   E: Early Mobility Performed?    F: Feeding Goal: Goals: Meet % EEN, EPN by RD f/u date  Status: Nutrition Goal Status: new   Current Diet Order   Procedures    Diet NPO Except for: Medication, Sips with Medication     Order Specific Question:   Except for     Answer:   Medication     Order Specific Question:   Except for     Answer:   Sips with Medication      AS: Analgesia/Sedation palliative comfort analgesia only   T: Thromboembolic Prophylaxis    H: HOB > 300 Yes   U: Stress Ulcer Prophylaxis (if needed)    G: Glucose Control    B: Bowel Function Stool Occurrence: 1   I: Indwelling Catheter (Lines & Cabello) " Necessity    D: De-escalation of Antimicrobials/Pharmacotherapies     Plan for the day/ETD Palliative care transfer    Code Status:  Family/Goals of Care: DNR/DNI         Critical secondary to Patient is currently receiving parenteral controlled substances: Morphine      Critical care was time spent personally by me on the following activities: development of treatment plan with patient or surrogate and bedside caregivers, discussions with consultants, evaluation of patient's response to treatment, examination of patient, ordering and performing treatments and interventions, ordering and review of laboratory studies, ordering and review of radiographic studies, pulse oximetry, re-evaluation of patient's condition. This critical care time did not overlap with that of any other provider or involve time for any procedures.     Fernanda Gonzales MD PGY 1  Critical Care Medicine  [facility]

## 2023-08-09 NOTE — PROGRESS NOTES
VANCOMYCIN DOSING BY PHARMACY DISCONTINUATION NOTE    Syl Basilio is a 67 y.o. female who had been consulted for vancomycin dosing.    The pharmacy consult for vancomycin dosing has been discontinued.     Vancomycin Dosing by Pharmacy Consult will sign-off. Please reconsult if necessary. Thank you for allowing us to participate in this patient's care.     Joe Mcmullen Pharm.D.  [id number]

## 2023-08-10 PROBLEM — N17.9 AKI (ACUTE KIDNEY INJURY): Status: ACTIVE | Noted: 2023-01-01

## 2023-08-10 PROBLEM — K76.7 HEPATORENAL SYNDROME: Status: ACTIVE | Noted: 2023-01-01

## 2023-08-10 PROBLEM — D68.9 COAGULOPATHY: Status: ACTIVE | Noted: 2023-01-01

## 2023-08-10 NOTE — PLAN OF CARE
Problem: Adult Inpatient Plan of Care  Goal: Plan of Care Review  Outcome: Ongoing, Not Progressing  Goal: Patient-Specific Goal (Individualized)  Outcome: Ongoing, Not Progressing  Goal: Absence of Hospital-Acquired Illness or Injury  Outcome: Ongoing, Not Progressing  Goal: Optimal Comfort and Wellbeing  Outcome: Ongoing, Not Progressing  Goal: Readiness for Transition of Care  Outcome: Ongoing, Not Progressing     Problem: Palliative Care  Goal: Enhanced Quality of Life  Outcome: Ongoing, Not Progressing     Problem: Infection  Goal: Absence of Infection Signs and Symptoms  Outcome: Ongoing, Not Progressing     Problem: Skin Injury Risk Increased  Goal: Skin Health and Integrity  Outcome: Ongoing, Not Progressing     Problem: Fall Injury Risk  Goal: Absence of Fall and Fall-Related Injury  Outcome: Ongoing, Not Progressing

## 2023-08-10 NOTE — NURSING
Pt had uneventful shift. Vitals remained stable and at baseline. Pt morphine drip titrated to 11 mg/hr. PRN ativan 1 mg given once. Family remained at bedside. Will continue to monitor pt remaining of shift.

## 2023-08-10 NOTE — ASSESSMENT & PLAN NOTE
Hepatic encephalopathy  Decompensated cirrhosis with ascites  History of HCV  Alcohol dependence  Lactic acidosis  Severe malnutrition  Hyponatremia  VERNON, likely hepatorenal syndrome  Coagulopathy  Anemia of chronic disease    Hospice Qualifying Diagnosis:  Decompensated hepatic cirrhosis, hepatorenal syndrome, hepatic encephalopathy    Symptom Assessment:  Overall, patient continues to have ongoing continuing requirements for active treatment and frequent physician/nursing reassessment  · Pain: controlled  · Dyspnea: controlled  · Agitation: controlled  · Mentation: lethargic, not interactive, sedated    Current Medications:      atropine 1% sublingually p.r.n. secretions    haloperidol IV p.r.n. agitation    LORazepam IV p.r.n. agitation    continuous morphine pump, titratable, max 15 mg per hour    ondansetron IV p.r.n. nausea/vomiting    prochlorperazine injection IV p.r.n. 2nd choice nausea/vomiting    scopolamine patch q.3 days for secretion      Family discussion updates:  · Adjusted morphine pump to increase max hourly dose based on conversation with family on 08/10.  Added atropine 8/10 for continued secretions.    Plan of care:  · Continue aggressive symptom management.    Follow up plans:   · Symptoms currently stabilized  · Death is imminent within a short period of time as evidenced by clinical deterioration such as mottling of skin, respiratory status change, declining vital signs and level of consciousness which are not conducive to medical transport.      Advanced Directives::  · Comfort measures only

## 2023-08-10 NOTE — PATIENT CARE CONFERENCE
[id number] MATHIEU   Syl Basilio  :1955     AGE:67 y.o.     SEX:female      STATUS:DNR      ISOLATION:No active isolations   ALLERGIES:Codeine   ADMIT DATE:  2023   PHYSICIAN:  Manolo Subramanian MD   DIAGNOSIS: Hospice care [Z51.5]  Comfort measures only status [Z51.5] Comfort measures only status  CC: No chief complaint on file.    CONSULTS: Palliative  Past Medical History:   Diagnosis Date    Age-related osteoporosis without current pathological fracture 3/7/2023    Alcohol dependence 2023    Anemia     Chronic hepatitis C without hepatic coma 7/3/2023    Decompensated cirrhosis related to hepatitis C virus (HCV) 2023    Elevated liver enzymes     Hepatitis     hep C ab +    History of blood transfusion     in 80s    Other ascites 2023    Seasonal allergies     Symptomatic anemia 7/3/2023     Scheduled procedure(s): No  Labs to Monitor (no values):   Recent Vitals:  Temp: 100.3 °F (37.9 °C)  Pulse: (!) 122  Resp: (!) 7  SpO2: 100 %  BP: (!) 97/53    Respiratory:    Cardiac:        Wt Readings from Last 2 Encounters:   23 49.7 kg (109 lb 9.1 oz)   23 62.9 kg (138 lb 12.5 oz)     GI/: No diet orders on file   Last Bowel Movement: 23    Neuro: ex:Dis.x4  Cabello catheter: Yes; Reason for continuation: Hospice/Comfort Care/Palliative Care  Skin:WDL   Tad Score: 11      Lines/Drains/Airways       Drain  Duration                  Urethral Catheter 23 Silicone 16 Fr. 2 days              Peripheral Intravenous Line  Duration                  Peripheral IV - Single Lumen 23 1200 20 G Right Antecubital 2 days                  Antibiotics (From admission, onward)      None          VTE Risk Mitigation (From admission, onward)      None          Glycemic control:  Recent Labs   Lab 23  0015 23  0634 23  1826 23  0349   POCTGLUCOSE 99 118* 118* 106     Fall risk: bed alarm  Mobility: GEMS ([place] Early Mobility Scale): Level 1-Primary  in bed activities    Nursing Update/Plan of Care: Pt here for inpatient hospice/comfort measures. Morphine gtt titrated to 10 mg/hr. Pt tolerating well. Medicated for pain, restlessness and secretions per MAR. Cabello in place. T&RQ2. Safety precautions in place. Call light in reach. No further concerns noted at this time.

## 2023-08-10 NOTE — PLAN OF CARE
[facility] ([place] [address])  Initial Discharge Assessment       Primary Care Provider: Jeramie Self MD    Admission Diagnosis: Hospice care [Z51.5]  Comfort measures only status [Z51.5]    Admission Date: 8/9/2023  Expected Discharge Date: 8/10/2023    Transition of Care Barriers: None    Payor: [facility] / Plan: [facility] / Product Type: Guarantor 3rd Party /     Extended Emergency Contact Information  Primary Emergency Contact: PrafulFidel  Address: [address]           [place] [address] [facility]  Home Phone: [phone]  Relation: Spouse  Secondary Emergency Contact: DarlinKylee  Mobile Phone: [phone]  Relation: Daughter    Discharge Plan A: Inpatient Hospice, Comfort care/withdrawal  Discharge Plan B: Comfort care/withdrawal      [place]  [address] [address]  [address]  [place] [address]  Phone: [phone] Fax: [phone]    [facility] [place] [address] [place] - [address] [address]  [address]  [place] [address]  Phone: [phone] Fax: [phone]    [facility]  [address]  [place] [address]  Phone: [phone] Fax: [phone]      Initial Assessment (most recent)       Adult Discharge Assessment - 08/10/23 1330          Discharge Assessment    Assessment Type Discharge Planning Assessment     Confirmed/corrected address, phone number and insurance Yes     Confirmed Demographics Correct on Facesheet     Source of Information family     If unable to respond/provide information was family/caregiver contacted? Yes     Contact Name/Number Fidel Basilio (Spouse)   [phone] (Home Phone)     When was your last doctors appointment? 05/10/23     Does patient/caregiver understand observation status Yes     Communicated DUSTIN with patient/caregiver Yes     People in Home spouse     Do you expect to return to your current living situation? No     Do you  have help at home or someone to help you manage your care at home? Yes     Who are your caregiver(s) and their phone number(s)? family     Prior to hospitilization cognitive status: Not Oriented to Place;Not Oriented to Person;Not Oriented to Time     Current cognitive status: Not Oriented to Person;Not Oriented to Place;Not Oriented to Time     Walking or Climbing Stairs transferring difficulty, dependent     Dressing/Bathing dressing difficulty, dependent     Equipment Currently Used at Home other (see comments)     Readmission within 30 days? Yes     Patient currently being followed by outpatient case management? No     Do you currently have service(s) that help you manage your care at home? No     Do you take prescription medications? Yes     Do you have prescription coverage? Yes     Do you have any problems affording any of your prescribed medications? No     Is the patient taking medications as prescribed? no     If no, which medications is patient not taking? comfort measures only     Who is going to help you get home at discharge? comfort measures only     How do you get to doctors appointments? family or friend will provide     Are you on dialysis? No     Do you take coumadin? No     Discharge Plan A Comfort care/withdrawal;Inpatient Hospice     Discharge Plan B Comfort care/withdrawal     DME Needed Upon Discharge  other (see comments)   TBD    Discharge Plan discussed with: Adult children     Transition of Care Barriers None        Physical Activity    On average, how many days per week do you engage in moderate to strenuous exercise (like a brisk walk)? Patient refused     On average, how many minutes do you engage in exercise at this level? Patient refused        Financial Resource Strain    How hard is it for you to pay for the very basics like food, housing, medical care, and heating? Patient refused        Housing Stability    In the last 12 months, was there a time when you were not able to pay the  mortgage or rent on time? Patient refused     In the last 12 months, was there a time when you did not have a steady place to sleep or slept in a shelter (including now)? Patient refused        Transportation Needs    In the past 12 months, has lack of transportation kept you from medical appointments or from getting medications? Patient refused     In the past 12 months, has lack of transportation kept you from meetings, work, or from getting things needed for daily living? Patient refused        Food Insecurity    Within the past 12 months, you worried that your food would run out before you got the money to buy more. Patient refused     Within the past 12 months, the food you bought just didn't last and you didn't have money to get more. Patient refused        Stress    Do you feel stress - tense, restless, nervous, or anxious, or unable to sleep at night because your mind is troubled all the time - these days? Patient refused        Social Connections    In a typical week, how many times do you talk on the phone with family, friends, or neighbors? Patient refused     How often do you get together with friends or relatives? Patient refused     How often do you attend Protestant or Buddhism services? Patient refused     Do you belong to any clubs or organizations such as Protestant groups, unions, fraternal or athletic groups, or school groups? Patient refused     How often do you attend meetings of the clubs or organizations you belong to? Patient refused     Are you , , , , never , or living with a partner?         Alcohol Use    Q1: How often do you have a drink containing alcohol? Patient refused     Q2: How many drinks containing alcohol do you have on a typical day when you are drinking? Patient refused     Q3: How often do you have six or more drinks on one occasion? Patient refused                     Readmission Assessment (most recent)       Readmission Assessment -  08/10/23 1448          Readmission    Why were you hospitalized in the last 30 days? 3 days ago     Why were you readmitted? Alarmed about signs/symptoms     When you left the hospital where did you go? Home with Family     Did patient/caregiver refused recommended DC plan? No     Tell me about what happened between when you left the hospital and the day you returned. Ascites due to alcoholic cirrhosis     When did you start not feeling well? Ascites due to alcoholic cirrhosis     Did you try to manage your symptoms your self? No     Did you call anyone? Yes     Who did you call? PCP     Did you try to see or did see a doctor or nurse before you came? Yes     Were you seen? No     Why? Did not want to see NP/or partner     Was this a planned readmission? Yes                        [facility]    Zoë Parks RN  Case Management  [facility]  [phone]

## 2023-08-10 NOTE — SUBJECTIVE & OBJECTIVE
Interval History:  Patient with multiple family members and nursing staff at bedside.  Currently appears to be resting comfortably with no signs of distress, pain, or dyspnea.  Family noted that when previous morphine pump ran out that patient appeared to be in discomfort.  Additionally, hospice nurse noted increased secretions and that patient likely needs increased hourly limit of morphine.    High Risk Conditions: Patient is currently receiving parenteral controlled substances: Morphine      Review of Systems: Unable to obtain due to sedation and AMS  Objective:     Vital Signs (Most Recent):  Temp: 97.8 °F (36.6 °C) (08/10/23 1150)  Pulse: (!) 115 (08/10/23 1150)  Resp: (!) 9 (08/10/23 1332)  BP: (!) 77/45 (08/10/23 1150)  SpO2: (!) 61 % (08/10/23 1150) Vital Signs (24h Range):  Temp:  [97.8 °F (36.6 °C)-100.3 °F (37.9 °C)] 97.8 °F (36.6 °C)  Pulse:  [115-122] 115  Resp:  [7-15] 9  SpO2:  [61 %-100 %] 61 %  BP: (77-98)/(45-55) 77/45        There is no height or weight on file to calculate BMI.  No intake or output data in the 24 hours ending 08/10/23 1844   Physical Exam  Constitutional:       General: She is not in acute distress.     Appearance: She is ill-appearing.      Comments: Frail   Cardiovascular:      Rate and Rhythm: Tachycardia present.   Pulmonary:      Effort: Pulmonary effort is normal. No respiratory distress.   Abdominal:      General: There is no distension.   Neurological:      Comments: Sedated         MELD 3.0: 35 at 8/9/2023  3:49 AM  MELD-Na: 33 at 8/9/2023  3:49 AM  Calculated from:  Serum Creatinine: 1.7 mg/dL at 8/9/2023  3:49 AM  Serum Sodium: 129 mmol/L at 8/9/2023  3:49 AM  Total Bilirubin: 7.1 mg/dL at 8/9/2023  3:49 AM  Serum Albumin: 2.3 g/dL at 8/9/2023  3:49 AM  INR(ratio): 2.6 at 8/8/2023  9:28 AM  Age at listing (hypothetical): 67 years  Sex: Female at 8/9/2023  3:49 AM      Significant Labs:  CBC:  Recent Labs   Lab 08/09/23  0513   WBC 8.23   HGB 6.4*   HCT 19.4*   PLT 44*      CMP:  Recent Labs   Lab 08/09/23  0349   *   K 3.1*   CL 97   CO2 21*   GLU 95   BUN 23   CREATININE 1.7*   CALCIUM 8.0*   PROT 5.6*   ALBUMIN 2.3*   BILITOT 7.1*   ALKPHOS 43*   AST 60*   ALT 21   ANIONGAP 11

## 2023-08-11 NOTE — PLAN OF CARE
23 0733   Final Note   Assessment Type Final Discharge Note   Anticipated Discharge Disposition   (time of death 0006)   What phone number can be called within the next 1-3 days to see how you are doing after discharge? [phone]   Post-Acute Status   Post-Acute Authorization Other   Other Status No Post-Acute Service Needs     Zoë Parks RN  Case Management  [facility]  [phone]

## 2023-08-11 NOTE — PLAN OF CARE
[facility] ([place] [address])  Discharge Final Note    Primary Care Provider: Jeramie Self MD    Expected Discharge Date: 2023    Final Discharge Note (most recent)       Final Note - 23 0733          Final Note    Assessment Type Final Discharge Note     Anticipated Discharge Disposition    time of death 0006    What phone number can be called within the next 1-3 days to see how you are doing after discharge? [phone]        Post-Acute Status    Post-Acute Authorization Other     Other Status No Post-Acute Service Needs                   Zoë Parks RN  Case Management  [facility]  [phone]

## 2023-08-11 NOTE — PROGRESS NOTES
[facility] ([address] Medicine  Progress Note    Patient Name: Syl Basilio  MRN: [id number]  Patient Class: IP- Hospice   Admission Date: 8/9/2023  Length of Stay: 1 days  Attending Physician: Reji Barros III*  Primary Care Provider: Jeramie Self MD        Subjective:     Principal Problem:Comfort measures only status        HPI:  68 y/o F with PMH of HCV and alcoholic cirrhosis, chronic anemia requiring transfusions, debility was admitted for worsening confusion and abdominal distention.  She was managed in ICU for decompensated liver disease hepatic encephalopathy.  She had no improvement with significant clinical decline and family eventually opted for DNR DNI, hospice care and patient was transitioned to inpatient hospice.      Overview/Hospital Course:  No notes on file    Interval History:  Patient with multiple family members and nursing staff at bedside.  Currently appears to be resting comfortably with no signs of distress, pain, or dyspnea.  Family noted that when previous morphine pump ran out that patient appeared to be in discomfort.  Additionally, hospice nurse noted increased secretions and that patient likely needs increased hourly limit of morphine.    High Risk Conditions: Patient is currently receiving parenteral controlled substances: Morphine      Review of Systems: Unable to obtain due to sedation and AMS  Objective:     Vital Signs (Most Recent):  Temp: 97.8 °F (36.6 °C) (08/10/23 1150)  Pulse: (!) 115 (08/10/23 1150)  Resp: (!) 9 (08/10/23 1332)  BP: (!) 77/45 (08/10/23 1150)  SpO2: (!) 61 % (08/10/23 1150) Vital Signs (24h Range):  Temp:  [97.8 °F (36.6 °C)-100.3 °F (37.9 °C)] 97.8 °F (36.6 °C)  Pulse:  [115-122] 115  Resp:  [7-15] 9  SpO2:  [61 %-100 %] 61 %  BP: (77-98)/(45-55) 77/45        There is no height or weight on file to calculate BMI.  No intake or output data in the 24 hours ending 08/10/23 1844   Physical  Exam  Constitutional:       General: She is not in acute distress.     Appearance: She is ill-appearing.      Comments: Frail   Cardiovascular:      Rate and Rhythm: Tachycardia present.   Pulmonary:      Effort: Pulmonary effort is normal. No respiratory distress.   Abdominal:      General: There is no distension.   Neurological:      Comments: Sedated         MELD 3.0: 35 at 8/9/2023  3:49 AM  MELD-Na: 33 at 8/9/2023  3:49 AM  Calculated from:  Serum Creatinine: 1.7 mg/dL at 8/9/2023  3:49 AM  Serum Sodium: 129 mmol/L at 8/9/2023  3:49 AM  Total Bilirubin: 7.1 mg/dL at 8/9/2023  3:49 AM  Serum Albumin: 2.3 g/dL at 8/9/2023  3:49 AM  INR(ratio): 2.6 at 8/8/2023  9:28 AM  Age at listing (hypothetical): 67 years  Sex: Female at 8/9/2023  3:49 AM      Significant Labs:  CBC:  Recent Labs   Lab 08/09/23  0513   WBC 8.23   HGB 6.4*   HCT 19.4*   PLT 44*     CMP:  Recent Labs   Lab 08/09/23  0349   *   K 3.1*   CL 97   CO2 21*   GLU 95   BUN 23   CREATININE 1.7*   CALCIUM 8.0*   PROT 5.6*   ALBUMIN 2.3*   BILITOT 7.1*   ALKPHOS 43*   AST 60*   ALT 21   ANIONGAP 11           Assessment/Plan:      * Comfort measures only status  Hepatic encephalopathy  Decompensated cirrhosis with ascites  History of HCV  Alcohol dependence  Lactic acidosis  Severe malnutrition  Hyponatremia  VERNON, likely hepatorenal syndrome  Coagulopathy  Anemia of chronic disease    Hospice Qualifying Diagnosis:  Decompensated hepatic cirrhosis, hepatorenal syndrome, hepatic encephalopathy    Symptom Assessment:  Overall, patient continues to have ongoing continuing requirements for active treatment and frequent physician/nursing reassessment  · Pain: controlled  · Dyspnea: controlled  · Agitation: controlled  · Mentation: lethargic, not interactive, sedated    Current Medications:      atropine 1% sublingually p.r.n. secretions    haloperidol IV p.r.n. agitation    LORazepam IV p.r.n. agitation    continuous morphine pump, titratable, max 15  mg per hour    ondansetron IV p.r.n. nausea/vomiting    prochlorperazine injection IV p.r.n. 2nd choice nausea/vomiting    scopolamine patch q.3 days for secretion      Family discussion updates:  · Adjusted morphine pump to increase max hourly dose based on conversation with family on 08/10.  Added atropine 8/10 for continued secretions.    Plan of care:  · Continue aggressive symptom management.    Follow up plans:   · Symptoms currently stabilized  · Death is imminent within a short period of time as evidenced by clinical deterioration such as mottling of skin, respiratory status change, declining vital signs and level of consciousness which are not conducive to medical transport.      Advanced Directives::  · Comfort measures only      VTE Risk Mitigation (From admission, onward)    None          Discharge Planning   DUSTIN: 8/11/2023     Code Status: DNR   Is the patient medically ready for discharge?: No    Reason for patient still in hospital (select all that apply): Patient trending condition  Discharge Plan A: Inpatient Hospice, Comfort care/withdrawal   Discharge Delays: None known at this time              Reji Barros III, MD  Department of Hospital Medicine   [facility] - Intensive Care ([place]-[facility])

## 2023-08-12 LAB
BACTERIA BLD CULT: NORMAL
BACTERIA BLD CULT: NORMAL

## 2023-08-12 NOTE — HOSPITAL COURSE
Per ICU discharging physician:  The patient was admitted to the MICU serves on  for profound hepatic encephalopathy and a GCS of 6-7. Pertinent labs: Ammonia 138, AST//34, ALP 70, Tbili 8.5, Albumin 1.3, PT/INR 23.8/2.4, UA with many bacteria, +WBC +RBC,  Lactate 10.3 (down trending, now 7.8). given 2 units PRBC earlier this week and hgb is stable at this time. H&H is 7.8/24. Currently on rocephin and lactulose via NG tube delivery. Hepatology following. Spoke with family and spouse regarding code status, both came to consensus to change her to DNR/DNI.  On , Patient's family decided to pursue comfort care inpatient. Resuscitative efforts ceased and palliative measures continued. Patient opted for inpatient hospice and patient will go with [facility] .      Hospital medicine course:  Patient admitted to inpatient hospice with hospitalist service as attending physician.  Patient's symptoms of pain, dyspnea, secretions, and agitation were treated with morphine continuous pump which was titratable by nursing staff, scopolamine patch, sublingual atropine, lorazepam, and haloperidol.  Discussion held with the patient's family and adjustments made to medications as needed. Patient  at 0006 on 2023.

## 2023-08-12 NOTE — DISCHARGE SUMMARY
[facility] - Intensive Care ([place] [address])  [facility] Medicine  Discharge Summary      Patient Name: Syl Basilio  MRN: [id number]  GARIMA: [id number]  Patient Class: IP- Inpatient  Admission Date: 8/7/2023  Hospital Length of Stay: 4 days  Discharge Date and Time:  08/11/2023 0006 a.m.  Attending Physician: Rebecca att. providers found   Discharging Provider: Reji Barros III, MD  Primary Care Provider: Jeramie Self MD  [facility] Medicine Team: [facility] Reji Barros III, MD  Primary Care Team: [facility]    HPI:   Per admitting physician:  67-year-old woman with a history of HCV and alcoholic cirrhosis, alcohol dependence, chronic anemia requiring transfusions, debility who presents to the emergency department for concerns of worsening confusion, brought to ED by her daughter Kylee    Patient was seen at [facility] emergency department on August 5th with complaints of weakness abdominal swelling was noted on lab work to have a hemoglobin of 5 no reported active bleeding, she was given 2 units PRBCs and discharged home afterward.  Patient has not had any bleeding since but has had worsening abdominal swelling and progressively worsening confusion. She is not eating or drinking in recent days, daughter tried to force feed her some mashed potatoes but patient was only pocketing oral contents.     Her daughter states that 1 month ago she had multiple teeth pulled and at that time patient stop drinking alcohol cold turkey.  Per primary care notes patient was drinking 6 pack of beer per week but daughter states that patient's alcohol use was a much higher degree, daily consumption of brown liquor and upwards of 12 beers per day.  Patient did not have any acute treatment to prevent alcohol withdrawal.      No tobacco use, daughter gave pt some of her rx marijuana drops, yesterday.   IN ED patient had diagnostic paracentesis, lactulose enema ordered and given, fecal management system placed  and .       * No surgery found *      Hospital Course:   Per ICU discharging physician:  The patient was admitted to the MICU serves on  for profound hepatic encephalopathy and a GCS of 6-7. Pertinent labs: Ammonia 138, AST//34, ALP 70, Tbili 8.5, Albumin 1.3, PT/INR 23.8/2.4, UA with many bacteria, +WBC +RBC,  Lactate 10.3 (down trending, now 7.8). given 2 units PRBC earlier this week and hgb is stable at this time. H&H is 7.8/24. Currently on rocephin and lactulose via NG tube delivery. Hepatology following. Spoke with family and spouse regarding code status, both came to consensus to change her to DNR/DNI.  On , Patient's family decided to pursue comfort care inpatient. Resuscitative efforts ceased and palliative measures continued. Patient opted for inpatient hospice and patient will go with [facility] .     Hospital medicine course:  Patient admitted to inpatient hospice with hospitalist service as attending physician.  Patient's symptoms of pain, dyspnea, secretions, and agitation were treated with morphine continuous pump which was titratable by nursing staff, scopolamine patch, sublingual atropine, lorazepam, and haloperidol.  Discussion held with the patient's family and adjustments made to medications as needed.  Patient  at 0006 on 2023.          Goals of Care Treatment Preferences:  Code Status: DNR          What is most important right now is to focus on Treatment of current decompensated state and if patient's condition does not improve and if she is not a transplant candidate, she is thinking most likely would like to shift towards comfort care measures and enrollment in home hospice services in order to keep her mother comfortable. .  Accordingly, we have decided that the best plan to meet the patient's goals includes continuing with treatment.      Consults:   Consults (From admission, onward)          Status Ordering Provider     Inpatient consult to Critical Care  Medicine  Once        Provider:  (Not yet assigned)    Completed JOSEPH STEWART     Inpatient consult to Hepatology  Once        Provider:  (Not yet assigned)    Completed SANDOVAL CAPPS     Inpatient consult to Registered Dietitian/Nutritionist  Once        Provider:  (Not yet assigned)    Completed JOSEPH STEWART            Psychiatric  Alcohol dependence  Patients daughter reports she has much higher alcohol consumption history than previously documented.  Given report that patient acutely stopped drinking alcohol - she was at high risk for alcohol withdrawal and could also be high risk for developing Wernicke Encephalopathy.     Start treatment level thiamine for potential wernicke encephalopathy.    MRI of brain: No MR findings to suggest Wernicke's encephalopathy.  Short-term follow-up suggested.Generalized cerebral volume loss.Mild left mastoid air cell effusion      GI  Other ascites  As per decompensated cirrhosis     F/u pending Ascitic fluid studies to r/o SBP      Decompensated cirrhosis related to hepatitis C virus (HCV)  -patient was not taking any Rx meds prior to admission  -If patient recovers from severe encephalopathic state and renal function stable - restart on Lasix and Spironolactine at Lasix 40mg daily + Spironolactone 100mg daily         Final Active Diagnoses:    Diagnosis Date Noted POA    PRINCIPAL PROBLEM:  Hepatic encephalopathy [K76.82] 08/07/2023 Yes    Comfort measures only status [Z51.5] 08/08/2023 Not Applicable    Severe malnutrition [E43] 08/08/2023 Unknown    ACP (advance care planning) [Z71.89] 08/08/2023 Not Applicable    Decompensated cirrhosis related to hepatitis C virus (HCV) [B19.20, K74.69] 08/07/2023 Yes    Other ascites [R18.8] 08/07/2023 Yes    Lactic acidosis [E87.20] 08/07/2023 Yes    Weakness [R53.1] 08/03/2023 Yes    Alcohol dependence [F10.20] 02/07/2023 Yes     Chronic    Anemia of chronic disease [D63.8] 10/22/2014 Yes      Problems Resolved During this  Admission:       Discharged Condition:     Disposition: Hospice/Medical Facility    Follow Up:    Patient Instructions:   No discharge procedures on file.    Significant Diagnostic Studies: N/A    Pending Diagnostic Studies:       Procedure Component Value Units Date/Time    Anti-Smooth Muscle Antibody [[id number]] Collected: 23    Order Status: Sent Lab Status: In process Updated: 23    Specimen: Blood     Narrative:      Collection has been rescheduled by VS1 at 2023 05:20 Reason:   Unable to collect Nurse Chastity notified  Collection has been rescheduled by KR3 at 2023 05:45 Reason:   Patient is very deydrated, notified BOBY Cali           Medications:  N/a    Indwelling Lines/Drains at time of discharge:   Lines/Drains/Airways       None                   Time spent on the discharge of patient: 25 minutes         Reji Barros III, MD  Department of Hospital Medicine  [facility] - Intensive Care ([place])

## 2023-08-12 NOTE — DISCHARGE SUMMARY
[facility] - [facility] ([place] [address])  [facility] Medicine  Discharge Summary      Patient Name: Syl Basilio  MRN: [id number]  GARIMA: [id number]  Patient Class: IP- Hospice  Admission Date: 8/9/2023  Hospital Length of Stay: 2 days  Discharge Date and Time: 8/11/2023  0006 AM  Attending Physician: Rebecca att. providers found   Discharging Provider: Reji Barros III, MD  Primary Care Provider: Jeramie Self MD  [facility] Medicine Team: [facility] Reji Barros III, MD  Primary Care Team: [facility]    HPI:   Per admitting physician:  67-year-old woman with a history of HCV and alcoholic cirrhosis, alcohol dependence, chronic anemia requiring transfusions, debility who presents to the emergency department for concerns of worsening confusion, brought to ED by her daughter Kylee     Patient was seen at [facility] emergency department on August 5th with complaints of weakness abdominal swelling was noted on lab work to have a hemoglobin of 5 no reported active bleeding, she was given 2 units PRBCs and discharged home afterward.  Patient has not had any bleeding since but has had worsening abdominal swelling and progressively worsening confusion. She is not eating or drinking in recent days, daughter tried to force feed her some mashed potatoes but patient was only pocketing oral contents.      Her daughter states that 1 month ago she had multiple teeth pulled and at that time patient stop drinking alcohol cold turkey.  Per primary care notes patient was drinking 6 pack of beer per week but daughter states that patient's alcohol use was a much higher degree, daily consumption of brown liquor and upwards of 12 beers per day.  Patient did not have any acute treatment to prevent alcohol withdrawal.       No tobacco use, daughter gave pt some of her rx marijuana drops, yesterday.   IN ED patient had diagnostic paracentesis, lactulose enema ordered and given, fecal management system placed  and .       * No surgery found *      Hospital Course:   Per ICU discharging physician:  The patient was admitted to the MICU serves on  for profound hepatic encephalopathy and a GCS of 6-7. Pertinent labs: Ammonia 138, AST//34, ALP 70, Tbili 8.5, Albumin 1.3, PT/INR 23.8/2.4, UA with many bacteria, +WBC +RBC,  Lactate 10.3 (down trending, now 7.8). given 2 units PRBC earlier this week and hgb is stable at this time. H&H is 7.8/24. Currently on rocephin and lactulose via NG tube delivery. Hepatology following. Spoke with family and spouse regarding code status, both came to consensus to change her to DNR/DNI.  On , Patient's family decided to pursue comfort care inpatient. Resuscitative efforts ceased and palliative measures continued. Patient opted for inpatient hospice and patient will go with [facility] .      Hospital medicine course:  Patient admitted to inpatient hospice with hospitalist service as attending physician.  Patient's symptoms of pain, dyspnea, secretions, and agitation were treated with morphine continuous pump which was titratable by nursing staff, scopolamine patch, sublingual atropine, lorazepam, and haloperidol.  Discussion held with the patient's family and adjustments made to medications as needed. Patient  at 0006 on 2023.       Consults:   Consults (From admission, onward)          Status Ordering Provider     Consult to Spiritual Care  Once        Provider:  (Not yet assigned)    Completed SANDOVAL CAPPS            No new Assessment & Plan notes have been filed under this hospital service since the last note was generated.  Service: Hospital Medicine    Final Active Diagnoses:    Diagnosis Date Noted POA    PRINCIPAL PROBLEM:  Comfort measures only status [Z51.5] 2023 Not Applicable    VERNON (acute kidney injury) [N17.9] 08/10/2023 No    Hepatorenal syndrome [K76.7] 08/10/2023 No    Coagulopathy [D68.9] 08/10/2023 Yes    Severe malnutrition [E43]  2023 Yes    Decompensated cirrhosis related to hepatitis C virus (HCV) [B19.20, K74.69] 2023 Yes    Hepatic encephalopathy [K76.82] 2023 Yes    Lactic acidosis [E87.20] 2023 Yes    Chronic hepatitis C without hepatic coma [B18.2] 2023 Yes    Alcohol dependence [F10.20] 2023 Yes     Chronic    Anemia of chronic disease [D63.8] 10/22/2014 Yes      Problems Resolved During this Admission:       Discharged Condition:     Disposition:  in Medical Facil*    Follow Up:    Patient Instructions:   No discharge procedures on file.    Significant Diagnostic Studies: N/A    Pending Diagnostic Studies:       None           Medications:  None    Indwelling Lines/Drains at time of discharge:   Lines/Drains/Airways       None                   Time spent on the discharge of patient: 25 minutes         Reji Barros III, MD  Department of Hospital Medicine  [facility] - Intensive Care ([place])

## 2023-08-14 LAB
BACTERIA SPEC ANAEROBE CULT: NORMAL
SMOOTH MUSCLE AB TITR SER IF: ABNORMAL {TITER}